# Patient Record
Sex: FEMALE | Race: WHITE | HISPANIC OR LATINO | Employment: UNEMPLOYED | URBAN - METROPOLITAN AREA
[De-identification: names, ages, dates, MRNs, and addresses within clinical notes are randomized per-mention and may not be internally consistent; named-entity substitution may affect disease eponyms.]

---

## 2018-06-18 LAB
EXTERNAL HIV SCREEN: NORMAL
HCV AB SER-ACNC: NON REACTIVE

## 2019-03-18 LAB
EXTERNAL HIV SCREEN: NORMAL
HCV AB SER-ACNC: NON REACTIVE

## 2020-09-08 LAB
EXTERNAL HIV SCREEN: NORMAL
HBA1C MFR BLD HPLC: 6.1 %

## 2020-12-14 LAB — HBA1C MFR BLD HPLC: 6.2 %

## 2021-05-28 ENCOUNTER — OFFICE VISIT (OUTPATIENT)
Dept: FAMILY MEDICINE CLINIC | Facility: CLINIC | Age: 61
End: 2021-05-28

## 2021-05-28 VITALS
DIASTOLIC BLOOD PRESSURE: 80 MMHG | TEMPERATURE: 97.3 F | WEIGHT: 175 LBS | BODY MASS INDEX: 37.76 KG/M2 | HEIGHT: 57 IN | OXYGEN SATURATION: 98 % | SYSTOLIC BLOOD PRESSURE: 122 MMHG | RESPIRATION RATE: 18 BRPM | HEART RATE: 67 BPM

## 2021-05-28 DIAGNOSIS — I10 ESSENTIAL HYPERTENSION: Primary | ICD-10-CM

## 2021-05-28 DIAGNOSIS — E78.5 HYPERLIPIDEMIA, UNSPECIFIED HYPERLIPIDEMIA TYPE: ICD-10-CM

## 2021-05-28 DIAGNOSIS — R73.03 PREDIABETES: ICD-10-CM

## 2021-05-28 DIAGNOSIS — J45.909 UNCOMPLICATED ASTHMA, UNSPECIFIED ASTHMA SEVERITY, UNSPECIFIED WHETHER PERSISTENT: ICD-10-CM

## 2021-05-28 PROCEDURE — 3008F BODY MASS INDEX DOCD: CPT | Performed by: FAMILY MEDICINE

## 2021-05-28 PROCEDURE — 1036F TOBACCO NON-USER: CPT | Performed by: FAMILY MEDICINE

## 2021-05-28 PROCEDURE — 99213 OFFICE O/P EST LOW 20 MIN: CPT | Performed by: FAMILY MEDICINE

## 2021-05-28 PROCEDURE — 3725F SCREEN DEPRESSION PERFORMED: CPT | Performed by: FAMILY MEDICINE

## 2021-05-28 RX ORDER — ATORVASTATIN CALCIUM 10 MG/1
10 TABLET, FILM COATED ORAL DAILY
COMMUNITY
End: 2021-05-28 | Stop reason: SDUPTHER

## 2021-05-28 RX ORDER — MONTELUKAST SODIUM 10 MG/1
10 TABLET ORAL
COMMUNITY
End: 2021-05-28 | Stop reason: SDUPTHER

## 2021-05-28 RX ORDER — ALBUTEROL SULFATE 90 UG/1
2 AEROSOL, METERED RESPIRATORY (INHALATION) EVERY 6 HOURS PRN
COMMUNITY
End: 2021-05-28 | Stop reason: SDUPTHER

## 2021-05-28 RX ORDER — ATORVASTATIN CALCIUM 10 MG/1
10 TABLET, FILM COATED ORAL DAILY
Qty: 90 TABLET | Refills: 1 | Status: SHIPPED | OUTPATIENT
Start: 2021-05-28 | End: 2021-09-10 | Stop reason: SDUPTHER

## 2021-05-28 RX ORDER — MONTELUKAST SODIUM 10 MG/1
10 TABLET ORAL
Qty: 90 TABLET | Refills: 1 | Status: SHIPPED | OUTPATIENT
Start: 2021-05-28 | End: 2021-09-10 | Stop reason: SDUPTHER

## 2021-05-28 RX ORDER — ALBUTEROL SULFATE 90 UG/1
1 AEROSOL, METERED RESPIRATORY (INHALATION) EVERY 6 HOURS PRN
Qty: 18 G | Refills: 2 | Status: SHIPPED | OUTPATIENT
Start: 2021-05-28 | End: 2021-09-10 | Stop reason: SDUPTHER

## 2021-05-28 NOTE — ASSESSMENT & PLAN NOTE
Patient reports due to allergies  Currently taking Montelukast with good control  Underwent ED evaluation once for asthma exacerbation and discharged with Albuterol inhaler which she has not used  Will refill Montelukast and Albuterol  Follow up as needed

## 2021-05-28 NOTE — ASSESSMENT & PLAN NOTE
Unclear what medications she is taking and patient is unsure - requested records from previous physician in [place]  BP today 122/80, at goal  Will follow up with appropriate labs and medication refills once information is available  Requested records and recommend patient contact pharmacy or call office with medications

## 2021-05-28 NOTE — PROGRESS NOTES
Assessment/Plan:    Hypertension  Unclear what medications she is taking and patient is unsure - requested records from previous physician in [place]  BP today 122/80, at goal  Will follow up with appropriate labs and medication refills once information is available  Requested records and recommend patient contact pharmacy or call office with medications  Hyperlipidemia  Patient reported - currently taking Atorvastatin 10 mg daily, confirmed by previous pharmacy  Refilled today  Will consider labs once records received if she is due  Asthma  Patient reports due to allergies  Currently taking Montelukast with good control  Underwent ED evaluation once for asthma exacerbation and discharged with Albuterol inhaler which she has not used  Will refill Montelukast and Albuterol  Follow up as needed  Prediabetes  Last labs unknown, will obtain via records request  Refilled Metformin 500 mg daily which she was previously prescribed  Will adjust regimen as appropriate when records available and labs ordered  Follow up in three months, will call once records received and if labs required  Diagnoses and all orders for this visit:    Essential hypertension    Hyperlipidemia, unspecified hyperlipidemia type  -     atorvastatin (LIPITOR) 10 mg tablet; Take 1 tablet (10 mg total) by mouth daily    Uncomplicated asthma, unspecified asthma severity, unspecified whether persistent  -     montelukast (SINGULAIR) 10 mg tablet; Take 1 tablet (10 mg total) by mouth daily at bedtime  -     albuterol (PROVENTIL HFA,VENTOLIN HFA) 90 mcg/act inhaler; Inhale 1 puff every 6 (six) hours as needed for wheezing or shortness of breath    Prediabetes  -     metFORMIN (GLUCOPHAGE) 500 mg tablet; Take 1 tablet (500 mg total) by mouth daily with breakfast          Subjective:      Patient ID: Maribell Blanco is a 57 y o  female  Moved from [place] to [place] one month ago, presents to establish care   Has history of HTN, prediabetes, hyperlipidemia, asthma  History of cholecystectomy  Was taking Atorvastatin 10 mg and CoQ10 for HLD  Takes Montelukast for asthma, well controlled  Metformin 500 mg for prediabetes  Macular and retinal degeneration, has appointment with ophthalmologist     Surgery - rufina, , appendectomy, ex lap for abdominal abscess  Last labs drawn unknown, 8-12+ months ago  Last mammogram 2020  Last pap one year ago, normal results  Unknown if HPV tested at that time  Last colonoscopy , 3 polyps removed, recommended f/u in 5 years  The following portions of the patient's history were reviewed and updated as appropriate:   She  has a past medical history of Anemia and Hypertension  She   Patient Active Problem List    Diagnosis Date Noted    Hypertension     Hyperlipidemia     Asthma     Prediabetes      She  has no past surgical history on file  Her family history is not on file  She  reports that she has never smoked  She has never used smokeless tobacco  She reports current alcohol use  She reports that she does not use drugs  Current Outpatient Medications   Medication Sig Dispense Refill    albuterol (PROVENTIL HFA,VENTOLIN HFA) 90 mcg/act inhaler Inhale 1 puff every 6 (six) hours as needed for wheezing or shortness of breath 18 g 2    atorvastatin (LIPITOR) 10 mg tablet Take 1 tablet (10 mg total) by mouth daily 90 tablet 1    metFORMIN (GLUCOPHAGE) 500 mg tablet Take 1 tablet (500 mg total) by mouth daily with breakfast 90 tablet 1    montelukast (SINGULAIR) 10 mg tablet Take 1 tablet (10 mg total) by mouth daily at bedtime 90 tablet 1     No current facility-administered medications for this visit  She is allergic to sulfa antibiotics       Review of Systems   Constitutional: Negative for appetite change, chills, fatigue and fever  HENT: Negative for congestion, rhinorrhea, sneezing and sore throat  Respiratory: Negative for cough and shortness of breath  Cardiovascular: Negative for chest pain, palpitations and leg swelling  Gastrointestinal: Negative for abdominal pain, blood in stool, constipation, diarrhea, nausea and vomiting  Objective:      /80 (BP Location: Left arm, Patient Position: Sitting, Cuff Size: Standard)   Pulse 67   Temp (!) 97 3 °F (36 3 °C) (Tympanic)   Resp 18   Ht 4' 9" (1 448 m)   Wt 79 4 kg (175 lb)   SpO2 98%   BMI 37 87 kg/m²          Physical Exam  Constitutional:       Appearance: Normal appearance  HENT:      Head: Normocephalic and atraumatic  Eyes:      Extraocular Movements: Extraocular movements intact  Conjunctiva/sclera: Conjunctivae normal    Cardiovascular:      Rate and Rhythm: Normal rate and regular rhythm  Pulses: Normal pulses  Heart sounds: Normal heart sounds  Pulmonary:      Effort: Pulmonary effort is normal       Breath sounds: Normal breath sounds  Abdominal:      General: Abdomen is flat  Bowel sounds are normal  There is no distension  Palpations: Abdomen is soft  Tenderness: There is no abdominal tenderness  Musculoskeletal: Normal range of motion  Skin:     General: Skin is warm and dry  Capillary Refill: Capillary refill takes less than 2 seconds  Neurological:      General: No focal deficit present  Mental Status: She is alert and oriented to person, place, and time     Psychiatric:         Mood and Affect: Mood normal          Behavior: Behavior normal

## 2021-05-28 NOTE — ASSESSMENT & PLAN NOTE
Last labs unknown, will obtain via records request  Refilled Metformin 500 mg daily which she was previously prescribed  Will adjust regimen as appropriate when records available and labs ordered

## 2021-05-28 NOTE — ASSESSMENT & PLAN NOTE
Patient reported - currently taking Atorvastatin 10 mg daily, confirmed by previous pharmacy  Refilled today  Will consider labs once records received if she is due

## 2021-08-24 ENCOUNTER — RA CDI HCC (OUTPATIENT)
Dept: OTHER | Facility: HOSPITAL | Age: 61
End: 2021-08-24

## 2021-08-24 NOTE — PROGRESS NOTES
[facility]  coding opportunities             Chart Reviewed * (Number of) Inbasket suggestions sent to Provider: 1     Problem listed updated  Provider Accepted, (number of) suggestions accepted: 1         Number of suggestions used: 0      Number of suggestions NOT actually used: 1     Patients insurance company: [employer] (Med[employer]vant[employer] and [employer])     Visit status: Patient arrived for their scheduled appointment        [facility] 75  coding opportunities             Chart Reviewed * (Number of) Inbasket suggestions sent to Provider: 1     Problem listed updated  Provider Accepted, (number of) suggestions accepted: 1               Patients insurance company: [employer] (Med[employer]vantage and [employer])           [place] [id number]  coding opportunities             Chart Reviewed * (Number of) Inbasket suggestions sent to Provider: 1                BMI>35   E66 01- Morbid (severe) obesity due to excess calories ([place] [id number] )    Z68  28- Z68 --  - Body mass index (BMI), adult  (Pick one from the Z68 35 - P42 --)           Per CMS/ICD 10 coding guidelines, to be used when BMI > 35 & <40 with one or more comorbidity (HTN, Hyperlipidemia)         Patients insurance company: [employer] (Medicare Advantage and [employer])

## 2021-08-25 PROBLEM — E66.01 SEVERE OBESITY (BMI 35.0-39.9) WITH COMORBIDITY (HCC): Status: ACTIVE | Noted: 2021-08-25

## 2021-08-31 ENCOUNTER — OFFICE VISIT (OUTPATIENT)
Dept: FAMILY MEDICINE CLINIC | Facility: CLINIC | Age: 61
End: 2021-08-31

## 2021-08-31 VITALS
SYSTOLIC BLOOD PRESSURE: 140 MMHG | DIASTOLIC BLOOD PRESSURE: 90 MMHG | HEIGHT: 57 IN | TEMPERATURE: 97.8 F | WEIGHT: 170 LBS | RESPIRATION RATE: 18 BRPM | BODY MASS INDEX: 36.68 KG/M2 | HEART RATE: 86 BPM

## 2021-08-31 DIAGNOSIS — R73.03 PREDIABETES: ICD-10-CM

## 2021-08-31 DIAGNOSIS — I10 ESSENTIAL HYPERTENSION: Primary | ICD-10-CM

## 2021-08-31 DIAGNOSIS — E78.5 HYPERLIPIDEMIA, UNSPECIFIED HYPERLIPIDEMIA TYPE: ICD-10-CM

## 2021-08-31 PROCEDURE — 99213 OFFICE O/P EST LOW 20 MIN: CPT | Performed by: FAMILY MEDICINE

## 2021-08-31 PROCEDURE — 3008F BODY MASS INDEX DOCD: CPT | Performed by: FAMILY MEDICINE

## 2021-08-31 PROCEDURE — 1036F TOBACCO NON-USER: CPT | Performed by: FAMILY MEDICINE

## 2021-08-31 RX ORDER — TRAVOPROST OPHTHALMIC SOLUTION 0.04 MG/ML
SOLUTION OPHTHALMIC
COMMUNITY
Start: 2021-08-21

## 2021-08-31 NOTE — ASSESSMENT & PLAN NOTE
Takes Candesartan 16/12 5 - one half tablet daily  /90 today  Continue current regimen, will check BMP and urine microalbumin/Cr ratio

## 2021-08-31 NOTE — PROGRESS NOTES
Assessment/Plan:    Hyperlipidemia  Last lipid panel August 2020  ASCVD risk at that time 4 5%, no need for statin at this time but she has been taking Atorvastatin 10 mg daily for the last year  Will check another Lipid panel and discontinue statin if normal     Prediabetes  Currently taking Metformin 500 mg daily  Will recheck A1c with labs - discontinue Metformin if normal     Hypertension  Takes Candesartan 16/12 5 - one half tablet daily  /90 today  Continue current regimen, will check BMP and urine microalbumin/Cr ratio  Diagnoses and all orders for this visit:    Essential hypertension  -     Basic metabolic panel; Future  -     Microalbumin / creatinine urine ratio    Prediabetes  -     HEMOGLOBIN A1C W/ EAG ESTIMATION; Future    Hyperlipidemia, unspecified hyperlipidemia type  -     Lipid Panel with Direct LDL reflex; Future    Other orders  -     travoprost (TRAVATAN-Z) 0 004 % ophthalmic solution; INSTILL 1 DROP INTO BOTH EYES IN THE EVENING  -     CALCIUM CITRATE PO; Take by mouth  -     candesartan 16 mg TABS 16 mg, hydrochlorothiazide 12 5 mg TABS 12 5 mg; Take 1 each by mouth daily Take 1/2 tab daily          Subjective:      Patient ID: Oli Mcmahon is a 57 y o  female  HPI   She present today for follow up of HTN, HLD, prediabetes  Has been compliant with medication regimen  Had bronchitis recently, resolved with steroids and antibiotics  No further acute concerns today  The following portions of the patient's history were reviewed and updated as appropriate: allergies, current medications, past family history, past medical history, past social history, past surgical history and problem list     Review of Systems   Constitutional: Negative for chills, fatigue and fever  Cardiovascular: Negative for chest pain  Gastrointestinal: Negative for abdominal pain  Musculoskeletal: Negative for back pain  Neurological: Negative for headaches     Psychiatric/Behavioral: Negative for sleep disturbance  Objective:      /90 (BP Location: Left arm, Patient Position: Sitting, Cuff Size: Large)   Pulse 86   Temp 97 8 °F (36 6 °C) (Temporal)   Resp 18   Ht 4' 9" (1 448 m)   Wt 77 1 kg (170 lb)   BMI 36 79 kg/m²          Physical Exam  Constitutional:       Appearance: Normal appearance  Eyes:      Extraocular Movements: Extraocular movements intact  Conjunctiva/sclera: Conjunctivae normal    Cardiovascular:      Rate and Rhythm: Normal rate and regular rhythm  Pulses: Normal pulses  Heart sounds: Normal heart sounds  Pulmonary:      Effort: Pulmonary effort is normal       Breath sounds: Normal breath sounds  Abdominal:      General: Abdomen is flat  Palpations: Abdomen is soft  Musculoskeletal:         General: Normal range of motion  Skin:     General: Skin is warm  Capillary Refill: Capillary refill takes less than 2 seconds  Neurological:      General: No focal deficit present  Mental Status: She is alert and oriented to person, place, and time     Psychiatric:         Mood and Affect: Mood normal          Behavior: Behavior normal

## 2021-08-31 NOTE — ASSESSMENT & PLAN NOTE
Currently taking Metformin 500 mg daily   Will recheck A1c with labs - discontinue Metformin if normal

## 2021-08-31 NOTE — ASSESSMENT & PLAN NOTE
Last lipid panel August 2020  ASCVD risk at that time 4 5%, no need for statin at this time but she has been taking Atorvastatin 10 mg daily for the last year   Will check another Lipid panel and discontinue statin if normal

## 2021-09-03 ENCOUNTER — APPOINTMENT (OUTPATIENT)
Dept: LAB | Age: 61
End: 2021-09-03
Payer: COMMERCIAL

## 2021-09-03 DIAGNOSIS — E78.5 HYPERLIPIDEMIA, UNSPECIFIED HYPERLIPIDEMIA TYPE: ICD-10-CM

## 2021-09-03 DIAGNOSIS — I10 ESSENTIAL HYPERTENSION: ICD-10-CM

## 2021-09-03 DIAGNOSIS — R73.03 PREDIABETES: ICD-10-CM

## 2021-09-03 LAB
ANION GAP SERPL CALCULATED.3IONS-SCNC: 6 MMOL/L (ref 4–13)
BUN SERPL-MCNC: 18 MG/DL (ref 5–25)
CALCIUM SERPL-MCNC: 9.5 MG/DL (ref 8.3–10.1)
CHLORIDE SERPL-SCNC: 105 MMOL/L (ref 100–108)
CHOLEST SERPL-MCNC: 148 MG/DL (ref 50–200)
CO2 SERPL-SCNC: 28 MMOL/L (ref 21–32)
CREAT SERPL-MCNC: 0.72 MG/DL (ref 0.6–1.3)
CREAT UR-MCNC: 45.9 MG/DL
EST. AVERAGE GLUCOSE BLD GHB EST-MCNC: 126 MG/DL
GFR SERPL CREATININE-BSD FRML MDRD: 90 ML/MIN/1.73SQ M
GLUCOSE P FAST SERPL-MCNC: 101 MG/DL (ref 65–99)
HBA1C MFR BLD: 6 %
HDLC SERPL-MCNC: 69 MG/DL
LDLC SERPL CALC-MCNC: 56 MG/DL (ref 0–100)
MICROALBUMIN UR-MCNC: <5 MG/L (ref 0–20)
MICROALBUMIN/CREAT 24H UR: <11 MG/G CREATININE (ref 0–30)
POTASSIUM SERPL-SCNC: 4 MMOL/L (ref 3.5–5.3)
SODIUM SERPL-SCNC: 139 MMOL/L (ref 136–145)
TRIGL SERPL-MCNC: 113 MG/DL

## 2021-09-03 PROCEDURE — 80048 BASIC METABOLIC PNL TOTAL CA: CPT

## 2021-09-03 PROCEDURE — 83036 HEMOGLOBIN GLYCOSYLATED A1C: CPT

## 2021-09-03 PROCEDURE — 82043 UR ALBUMIN QUANTITATIVE: CPT | Performed by: FAMILY MEDICINE

## 2021-09-03 PROCEDURE — 36415 COLL VENOUS BLD VENIPUNCTURE: CPT

## 2021-09-03 PROCEDURE — 80061 LIPID PANEL: CPT

## 2021-09-03 PROCEDURE — 82570 ASSAY OF URINE CREATININE: CPT | Performed by: FAMILY MEDICINE

## 2021-09-10 ENCOUNTER — TELEPHONE (OUTPATIENT)
Dept: FAMILY MEDICINE CLINIC | Facility: CLINIC | Age: 61
End: 2021-09-10

## 2021-09-10 DIAGNOSIS — E78.5 HYPERLIPIDEMIA, UNSPECIFIED HYPERLIPIDEMIA TYPE: ICD-10-CM

## 2021-09-10 DIAGNOSIS — R73.03 PREDIABETES: ICD-10-CM

## 2021-09-10 DIAGNOSIS — J45.909 UNCOMPLICATED ASTHMA, UNSPECIFIED ASTHMA SEVERITY, UNSPECIFIED WHETHER PERSISTENT: ICD-10-CM

## 2021-09-10 RX ORDER — ALBUTEROL SULFATE 90 UG/1
1 AEROSOL, METERED RESPIRATORY (INHALATION) EVERY 6 HOURS PRN
Qty: 18 G | Refills: 2 | Status: SHIPPED | OUTPATIENT
Start: 2021-09-10 | End: 2022-04-05 | Stop reason: SDUPTHER

## 2021-09-10 RX ORDER — MONTELUKAST SODIUM 10 MG/1
10 TABLET ORAL
Qty: 90 TABLET | Refills: 1 | Status: SHIPPED | OUTPATIENT
Start: 2021-09-10 | End: 2022-03-18

## 2021-09-10 RX ORDER — ATORVASTATIN CALCIUM 10 MG/1
10 TABLET, FILM COATED ORAL DAILY
Qty: 90 TABLET | Refills: 1 | Status: SHIPPED | OUTPATIENT
Start: 2021-09-10 | End: 2022-04-05

## 2021-09-10 NOTE — TELEPHONE ENCOUNTER
Patient called requesting refills on Metformin 500 mg, albuterol, atorvastatin 10 mg, montelukast 10 mg   Please send medication to [facility] pharmacy on [address] in [facility]

## 2021-09-13 DIAGNOSIS — I10 ESSENTIAL HYPERTENSION: Primary | ICD-10-CM

## 2021-09-14 RX ORDER — CANDESARTAN 16 MG/1
16 TABLET ORAL DAILY
Qty: 90 TABLET | Refills: 1 | OUTPATIENT
Start: 2021-09-14

## 2021-09-16 DIAGNOSIS — I10 ESSENTIAL HYPERTENSION: ICD-10-CM

## 2021-09-16 DIAGNOSIS — E78.5 HYPERLIPIDEMIA, UNSPECIFIED HYPERLIPIDEMIA TYPE: Primary | ICD-10-CM

## 2021-09-16 DIAGNOSIS — E66.01 SEVERE OBESITY (BMI 35.0-39.9) WITH COMORBIDITY (HCC): ICD-10-CM

## 2021-09-16 RX ORDER — CANDESARTAN CILEXETIL AND HYDROCHLOROTHIAZIDE 16; 12.5 MG/1; MG/1
0.5 TABLET ORAL DAILY
Qty: 45 TABLET | Refills: 1 | Status: SHIPPED | OUTPATIENT
Start: 2021-09-16 | End: 2022-03-16

## 2021-09-16 NOTE — TELEPHONE ENCOUNTER
Jessica Burrell calling again this med has not been sent, can it be sent by end of day today?  They are worried about pt missing so many days without the Candesartan/hctz

## 2021-09-17 ENCOUNTER — TELEPHONE (OUTPATIENT)
Dept: FAMILY MEDICINE CLINIC | Facility: CLINIC | Age: 61
End: 2021-09-17

## 2021-11-19 ENCOUNTER — APPOINTMENT (OUTPATIENT)
Dept: LAB | Age: 61
End: 2021-11-19
Payer: COMMERCIAL

## 2021-11-19 DIAGNOSIS — Z01.818 OTHER SPECIFIED PRE-OPERATIVE EXAMINATION: ICD-10-CM

## 2021-11-19 LAB
HCT VFR BLD AUTO: 41.7 % (ref 34.8–46.1)
HGB BLD-MCNC: 13.1 G/DL (ref 11.5–15.4)

## 2021-11-19 PROCEDURE — 36415 COLL VENOUS BLD VENIPUNCTURE: CPT

## 2021-11-19 PROCEDURE — 85014 HEMATOCRIT: CPT

## 2021-11-19 PROCEDURE — 85018 HEMOGLOBIN: CPT

## 2021-11-22 ENCOUNTER — OFFICE VISIT (OUTPATIENT)
Dept: FAMILY MEDICINE CLINIC | Facility: CLINIC | Age: 61
End: 2021-11-22

## 2021-11-22 VITALS
BODY MASS INDEX: 35.3 KG/M2 | OXYGEN SATURATION: 99 % | HEART RATE: 61 BPM | WEIGHT: 163.6 LBS | HEIGHT: 57 IN | SYSTOLIC BLOOD PRESSURE: 134 MMHG | TEMPERATURE: 97.9 F | DIASTOLIC BLOOD PRESSURE: 80 MMHG

## 2021-11-22 DIAGNOSIS — R73.03 PREDIABETES: ICD-10-CM

## 2021-11-22 DIAGNOSIS — Z01.818 PRE-OP EVALUATION: Primary | ICD-10-CM

## 2021-11-22 DIAGNOSIS — E66.01 SEVERE OBESITY (BMI 35.0-39.9) WITH COMORBIDITY (HCC): ICD-10-CM

## 2021-11-22 PROBLEM — H26.9 CATARACT: Status: ACTIVE | Noted: 2021-11-22

## 2021-11-22 LAB — SL AMB POCT HEMOGLOBIN AIC: 5.8 (ref ?–6.5)

## 2021-11-22 PROCEDURE — 3008F BODY MASS INDEX DOCD: CPT | Performed by: FAMILY MEDICINE

## 2021-11-22 PROCEDURE — 99242 OFF/OP CONSLTJ NEW/EST SF 20: CPT | Performed by: FAMILY MEDICINE

## 2021-11-22 PROCEDURE — 83036 HEMOGLOBIN GLYCOSYLATED A1C: CPT | Performed by: FAMILY MEDICINE

## 2021-11-22 PROCEDURE — 93000 ELECTROCARDIOGRAM COMPLETE: CPT | Performed by: FAMILY MEDICINE

## 2021-11-22 PROCEDURE — 1036F TOBACCO NON-USER: CPT | Performed by: FAMILY MEDICINE

## 2022-01-04 ENCOUNTER — OFFICE VISIT (OUTPATIENT)
Dept: FAMILY MEDICINE CLINIC | Facility: CLINIC | Age: 62
End: 2022-01-04

## 2022-01-04 VITALS
HEIGHT: 55 IN | OXYGEN SATURATION: 97 % | TEMPERATURE: 97.7 F | HEART RATE: 76 BPM | DIASTOLIC BLOOD PRESSURE: 80 MMHG | BODY MASS INDEX: 39.76 KG/M2 | SYSTOLIC BLOOD PRESSURE: 130 MMHG | WEIGHT: 171.8 LBS

## 2022-01-04 DIAGNOSIS — E66.01 CLASS 3 SEVERE OBESITY DUE TO EXCESS CALORIES WITH BODY MASS INDEX (BMI) OF 40.0 TO 44.9 IN ADULT, UNSPECIFIED WHETHER SERIOUS COMORBIDITY PRESENT (HCC): ICD-10-CM

## 2022-01-04 DIAGNOSIS — I10 PRIMARY HYPERTENSION: ICD-10-CM

## 2022-01-04 DIAGNOSIS — H26.40 SECONDARY CATARACT, UNSPECIFIED LATERALITY, UNSPECIFIED SECONDARY CATARACT TYPE: Primary | ICD-10-CM

## 2022-01-04 PROBLEM — E66.813 CLASS 3 SEVERE OBESITY DUE TO EXCESS CALORIES IN ADULT (HCC): Status: ACTIVE | Noted: 2021-08-25

## 2022-01-04 PROCEDURE — 1036F TOBACCO NON-USER: CPT | Performed by: FAMILY MEDICINE

## 2022-01-04 PROCEDURE — 3008F BODY MASS INDEX DOCD: CPT | Performed by: FAMILY MEDICINE

## 2022-01-04 PROCEDURE — 99213 OFFICE O/P EST LOW 20 MIN: CPT | Performed by: FAMILY MEDICINE

## 2022-01-04 RX ORDER — BROMFENAC SODIUM 0.7 MG/ML
SOLUTION/ DROPS OPHTHALMIC
COMMUNITY
Start: 2021-11-09

## 2022-01-04 RX ORDER — GATIFLOXACIN 5 MG/ML
SOLUTION/ DROPS OPHTHALMIC
COMMUNITY
Start: 2021-11-09

## 2022-01-04 RX ORDER — TRIPROLIDINE/PSEUDOEPHEDRINE 2.5MG-60MG
TABLET ORAL
COMMUNITY
Start: 2021-12-21

## 2022-01-04 NOTE — PROGRESS NOTES
Assessment/Plan:    Cataract  Underwent corrective surgery 1 month ago, still has some blurry vision, has follow up with ophthalmology on 1/27/22  Class 3 severe obesity due to excess calories in adult (HCC)  BMI increased due to poor diet recently, dietary counseling provided  She is agreeable to weight management referral      Hypertension  BP well controlled, at goal  Last urine micro/cr normal  No changes to current regimen  Follow up in 3 months       Diagnoses and all orders for this visit:    Secondary cataract, unspecified laterality, unspecified secondary cataract type    Class 3 severe obesity due to excess calories with body mass index (BMI) of 40 0 to 44 9 in adult, unspecified whether serious comorbidity present [facility])  -     Ambulatory referral to Weight Management; Future    Primary hypertension            Subjective:      Patient ID: Kendal Caba is a 57 y o  female  HPI  Patient presents for follow up HTN, obesity  BP well controlled on current regimen  Weight is increased, patient states poor diet during holidays  She plans to eat better and exercise and is agreeable to weight management referral  She has no further acute concerns at this time  The following portions of the patient's history were reviewed and updated as appropriate: allergies, current medications, past family history, past medical history, past social history, past surgical history and problem list     Review of Systems   Constitutional: Negative for fatigue  HENT: Negative for congestion and rhinorrhea  Eyes: Positive for visual disturbance  Negative for photophobia, pain, discharge and redness  Endocrine: Negative for polyuria  Genitourinary: Negative for dysuria  Psychiatric/Behavioral: Negative for sleep disturbance           Objective:      /80 (BP Location: Left arm, Patient Position: Sitting, Cuff Size: Standard)   Pulse 76   Temp 97 7 °F (36 5 °C) (Temporal)   Ht 4' 6 7" (1 389 m)   Wt 77 9 kg (171 lb 12 8 oz)   SpO2 97%   BMI 40 37 kg/m²          Physical Exam  Vitals reviewed  Eyes:      Extraocular Movements: Extraocular movements intact  Conjunctiva/sclera: Conjunctivae normal    Cardiovascular:      Rate and Rhythm: Normal rate and regular rhythm  Pulses: Normal pulses  Heart sounds: Normal heart sounds  Pulmonary:      Effort: Pulmonary effort is normal       Breath sounds: Normal breath sounds  Abdominal:      Palpations: Abdomen is soft  Musculoskeletal:         General: Normal range of motion  Cervical back: Normal range of motion  Skin:     General: Skin is warm and dry  Capillary Refill: Capillary refill takes less than 2 seconds  Neurological:      General: No focal deficit present  Mental Status: She is alert and oriented to person, place, and time     Psychiatric:         Mood and Affect: Mood normal          Behavior: Behavior normal

## 2022-01-04 NOTE — ASSESSMENT & PLAN NOTE
Underwent corrective surgery 1 month ago, still has some blurry vision, has follow up with ophthalmology on 1/27/22

## 2022-01-04 NOTE — ASSESSMENT & PLAN NOTE
BMI increased due to poor diet recently, dietary counseling provided   She is agreeable to weight management referral

## 2022-01-04 NOTE — ASSESSMENT & PLAN NOTE
BP well controlled, at goal  Last urine micro/cr normal  No changes to current regimen  Follow up in 3 months

## 2022-03-16 DIAGNOSIS — E66.01 SEVERE OBESITY (BMI 35.0-39.9) WITH COMORBIDITY (HCC): ICD-10-CM

## 2022-03-16 DIAGNOSIS — E78.5 HYPERLIPIDEMIA, UNSPECIFIED HYPERLIPIDEMIA TYPE: ICD-10-CM

## 2022-03-16 DIAGNOSIS — I10 ESSENTIAL HYPERTENSION: ICD-10-CM

## 2022-03-16 RX ORDER — CANDESARTAN CILEXETIL AND HYDROCHLOROTHIAZIDE 16; 12.5 MG/1; MG/1
TABLET ORAL
Qty: 45 TABLET | Refills: 1 | Status: SHIPPED | OUTPATIENT
Start: 2022-03-16

## 2022-03-18 DIAGNOSIS — J45.909 UNCOMPLICATED ASTHMA, UNSPECIFIED ASTHMA SEVERITY, UNSPECIFIED WHETHER PERSISTENT: ICD-10-CM

## 2022-03-18 RX ORDER — MONTELUKAST SODIUM 10 MG/1
TABLET ORAL
Qty: 90 TABLET | Refills: 1 | Status: SHIPPED | OUTPATIENT
Start: 2022-03-18

## 2022-03-28 ENCOUNTER — RA CDI HCC (OUTPATIENT)
Dept: OTHER | Facility: HOSPITAL | Age: 62
End: 2022-03-28

## 2022-03-28 NOTE — PROGRESS NOTES
[facility]  coding opportunities       Chart reviewed, no opportunity found: CHART REVIEWED, NO OPPORTUNITY FOUND        Patients Insurance        Commercial Insurance: [address] [place] [address]

## 2022-04-05 ENCOUNTER — OFFICE VISIT (OUTPATIENT)
Dept: FAMILY MEDICINE CLINIC | Facility: CLINIC | Age: 62
End: 2022-04-05

## 2022-04-05 VITALS
WEIGHT: 173.8 LBS | BODY MASS INDEX: 40.22 KG/M2 | HEIGHT: 55 IN | RESPIRATION RATE: 18 BRPM | OXYGEN SATURATION: 98 % | DIASTOLIC BLOOD PRESSURE: 80 MMHG | HEART RATE: 86 BPM | SYSTOLIC BLOOD PRESSURE: 126 MMHG | TEMPERATURE: 97.6 F

## 2022-04-05 DIAGNOSIS — Z13.820 OSTEOPOROSIS SCREENING: ICD-10-CM

## 2022-04-05 DIAGNOSIS — J45.909 UNCOMPLICATED ASTHMA, UNSPECIFIED ASTHMA SEVERITY, UNSPECIFIED WHETHER PERSISTENT: ICD-10-CM

## 2022-04-05 DIAGNOSIS — Z12.31 ENCOUNTER FOR SCREENING MAMMOGRAM FOR BREAST CANCER: ICD-10-CM

## 2022-04-05 DIAGNOSIS — Z00.00 ANNUAL PHYSICAL EXAM: Primary | ICD-10-CM

## 2022-04-05 PROCEDURE — 1036F TOBACCO NON-USER: CPT | Performed by: FAMILY MEDICINE

## 2022-04-05 PROCEDURE — 3008F BODY MASS INDEX DOCD: CPT | Performed by: FAMILY MEDICINE

## 2022-04-05 PROCEDURE — 99396 PREV VISIT EST AGE 40-64: CPT | Performed by: FAMILY MEDICINE

## 2022-04-05 PROCEDURE — 3725F SCREEN DEPRESSION PERFORMED: CPT | Performed by: FAMILY MEDICINE

## 2022-04-05 RX ORDER — ALBUTEROL SULFATE 90 UG/1
1 AEROSOL, METERED RESPIRATORY (INHALATION) EVERY 6 HOURS PRN
Qty: 18 G | Refills: 2 | Status: SHIPPED | OUTPATIENT
Start: 2022-04-05

## 2022-04-05 NOTE — PROGRESS NOTES
[facility]    NAME: William Varela  AGE: 60 y o  SEX: female  : 1960     DATE: 2022     Assessment and Plan:     Problem List Items Addressed This Visit        Respiratory    Asthma    Relevant Medications    albuterol (PROVENTIL HFA,VENTOLIN HFA) 90 mcg/act inhaler       Other    Annual physical exam - Primary     Up to date on labs  Mammogram ordered  DXA ordered  Up to date on cervical and colon cancer screening  Follow up in 6 months           Other Visit Diagnoses     Osteoporosis screening        Relevant Orders    DXA bone density spine hip and pelvis    Encounter for screening mammogram for breast cancer        Relevant Orders    Mammo screening bilateral w 3d & cad          Immunizations and preventive care screenings were discussed with patient today  Appropriate education was printed on patient's after visit summary  Counseling:  Alcohol/drug use: discussed moderation in alcohol intake, the recommendations for healthy alcohol use, and avoidance of illicit drug use  Dental Health: discussed importance of regular tooth brushing, flossing, and dental visits  · Exercise: the importance of regular exercise/physical activity was discussed  Recommend exercise 3-5 times per week for at least 30 minutes  Return in 6 months (on 10/5/2022)  Chief Complaint:     No chief complaint on file  History of Present Illness:     Adult Annual Physical   Patient here for a comprehensive physical exam  The patient reports no problems  Diet and Physical Activity  · Diet/Nutrition: well balanced diet  · Exercise: no formal exercise        Depression Screening  PHQ-2/9 Depression Screening    Little interest or pleasure in doing things: 0 - not at all  Feeling down, depressed, or hopeless: 0 - not at all  PHQ-2 Score: 0  PHQ-2 Interpretation: Negative depression screen       General Health  · Sleep: sleeps well and gets 7-8 hours of sleep on average  · Hearing: normal - bilateral   · Vision: goes for regular eye exams and wears glasses  · Dental: no dental visits for >1 year and brushes teeth twice daily  · Last pap 12/2020, cotest with HPV both negative     Review of Systems:     Review of Systems   Constitutional: Negative for chills, fatigue and fever  HENT: Negative for congestion  Respiratory: Negative for cough  Cardiovascular: Negative for chest pain  Gastrointestinal: Negative for abdominal pain  Musculoskeletal: Negative for back pain  Neurological: Negative for headaches  Psychiatric/Behavioral: Negative for sleep disturbance  Past Medical History:     Past Medical History:   Diagnosis Date    Anemia     Hypertension       Past Surgical History:     History reviewed  No pertinent surgical history  Social History:     Social History     Socioeconomic History    Marital status: Single     Spouse name: None    Number of children: None    Years of education: None    Highest education level: None   Occupational History    None   Tobacco Use    Smoking status: Never Smoker    Smokeless tobacco: Never Used   Vaping Use    Vaping Use: Never used   Substance and Sexual Activity    Alcohol use: Never    Drug use: Never    Sexual activity: Not Currently     Partners: Male   Other Topics Concern    None   Social History Narrative    None     Social Determinants of Health     Financial Resource Strain: Low Risk     Difficulty of Paying Living Expenses: Not hard at all   Food Insecurity: No Food Insecurity    Worried About Running Out of Food in the Last Year: Never true    Nicki of Food in the Last Year: Never true   Transportation Needs: No Transportation Needs    Lack of Transportation (Medical): No    Lack of Transportation (Non-Medical):  No   Physical Activity: Not on file   Stress: Not on file   Social Connections: Not on file   Intimate Partner Violence: Not on file   Housing Stability: Not on file      Family History:     Family History   Problem Relation Age of Onset    No Known Problems Mother     No Known Problems Father       Current Medications:     Current Outpatient Medications   Medication Sig Dispense Refill    albuterol (PROVENTIL HFA,VENTOLIN HFA) 90 mcg/act inhaler Inhale 1 puff every 6 (six) hours as needed for wheezing or shortness of breath 18 g 2    CALCIUM CITRATE PO Take by mouth      candesartan-hydrochlorothiazide (ATACAND HCT) 16-12 5 MG per tablet TAKE 1/2 TABLET BY MOUTH EVERY DAY 45 tablet 1    Durezol 0 05 % EMUL       gatifloxacin (ZYMAXID) 0 5 %       montelukast (SINGULAIR) 10 mg tablet TAKE 1 TABLET BY MOUTH DAILY AT BEDTIME 90 tablet 1    Prolensa 0 07 % SOLN       travoprost (TRAVATAN-Z) 0 004 % ophthalmic solution INSTILL 1 DROP INTO BOTH EYES IN THE EVENING       No current facility-administered medications for this visit  Allergies: Allergies   Allergen Reactions    Sulfa Antibiotics Blisters      Physical Exam:     /80 (BP Location: Left arm, Patient Position: Sitting, Cuff Size: Large)   Pulse 86   Temp 97 6 °F (36 4 °C) (Temporal)   Resp 18   Ht 4' 6 7" (1 389 m)   Wt 78 8 kg (173 lb 12 8 oz)   SpO2 98%   BMI 40 84 kg/m²     Physical Exam  Vitals reviewed  Constitutional:       Appearance: Normal appearance  HENT:      Head: Normocephalic and atraumatic  Nose: Nose normal    Eyes:      Extraocular Movements: Extraocular movements intact  Conjunctiva/sclera: Conjunctivae normal    Cardiovascular:      Rate and Rhythm: Normal rate and regular rhythm  Pulses: Normal pulses  Heart sounds: Normal heart sounds  Pulmonary:      Effort: Pulmonary effort is normal       Breath sounds: Normal breath sounds  Abdominal:      General: Abdomen is flat  Bowel sounds are normal       Palpations: Abdomen is soft     Musculoskeletal:         General: Normal range of motion  Skin:     General: Skin is warm and dry  Capillary Refill: Capillary refill takes less than 2 seconds  Neurological:      General: No focal deficit present  Mental Status: She is alert and oriented to person, place, and time     Psychiatric:         Mood and Affect: Mood normal          Behavior: Behavior normal           Laura Solitario DO  [address]

## 2022-04-05 NOTE — PATIENT INSTRUCTIONS

## 2022-04-05 NOTE — ASSESSMENT & PLAN NOTE
Up to date on labs  Mammogram ordered  DXA ordered  Up to date on cervical and colon cancer screening  Follow up in 6 months

## 2022-08-05 ENCOUNTER — HOSPITAL ENCOUNTER (OUTPATIENT)
Dept: RADIOLOGY | Facility: IMAGING CENTER | Age: 62
Discharge: HOME | End: 2022-08-05
Payer: COMMERCIAL

## 2022-08-05 VITALS — WEIGHT: 176 LBS | BODY MASS INDEX: 40.73 KG/M2 | HEIGHT: 55 IN

## 2022-08-05 DIAGNOSIS — Z12.31 ENCOUNTER FOR SCREENING MAMMOGRAM FOR BREAST CANCER: ICD-10-CM

## 2022-08-05 DIAGNOSIS — Z13.820 OSTEOPOROSIS SCREENING: ICD-10-CM

## 2022-08-05 PROCEDURE — 77063 BREAST TOMOSYNTHESIS BI: CPT

## 2022-08-05 PROCEDURE — 77080 DXA BONE DENSITY AXIAL: CPT

## 2022-08-05 PROCEDURE — 77067 SCR MAMMO BI INCL CAD: CPT

## 2022-09-04 DIAGNOSIS — I10 ESSENTIAL HYPERTENSION: ICD-10-CM

## 2022-09-04 DIAGNOSIS — E66.01 SEVERE OBESITY (BMI 35.0-39.9) WITH COMORBIDITY (HCC): ICD-10-CM

## 2022-09-04 DIAGNOSIS — J45.909 UNCOMPLICATED ASTHMA, UNSPECIFIED ASTHMA SEVERITY, UNSPECIFIED WHETHER PERSISTENT: ICD-10-CM

## 2022-09-04 DIAGNOSIS — E78.5 HYPERLIPIDEMIA, UNSPECIFIED HYPERLIPIDEMIA TYPE: ICD-10-CM

## 2022-09-06 RX ORDER — CANDESARTAN CILEXETIL AND HYDROCHLOROTHIAZIDE 16; 12.5 MG/1; MG/1
TABLET ORAL
Qty: 45 TABLET | Refills: 1 | Status: SHIPPED | OUTPATIENT
Start: 2022-09-06

## 2022-09-06 RX ORDER — MONTELUKAST SODIUM 10 MG/1
TABLET ORAL
Qty: 90 TABLET | Refills: 1 | Status: SHIPPED | OUTPATIENT
Start: 2022-09-06

## 2022-10-07 ENCOUNTER — OFFICE VISIT (OUTPATIENT)
Dept: FAMILY MEDICINE CLINIC | Facility: CLINIC | Age: 62
End: 2022-10-07

## 2022-10-07 VITALS
DIASTOLIC BLOOD PRESSURE: 85 MMHG | HEART RATE: 68 BPM | SYSTOLIC BLOOD PRESSURE: 145 MMHG | BODY MASS INDEX: 40.45 KG/M2 | RESPIRATION RATE: 20 BRPM | HEIGHT: 55 IN | OXYGEN SATURATION: 98 % | TEMPERATURE: 98.1 F | WEIGHT: 174.8 LBS

## 2022-10-07 DIAGNOSIS — Z13.220 ENCOUNTER FOR LIPID SCREENING FOR CARDIOVASCULAR DISEASE: Primary | ICD-10-CM

## 2022-10-07 DIAGNOSIS — Z13.6 ENCOUNTER FOR LIPID SCREENING FOR CARDIOVASCULAR DISEASE: Primary | ICD-10-CM

## 2022-10-07 DIAGNOSIS — E66.01 CLASS 3 SEVERE OBESITY DUE TO EXCESS CALORIES WITH BODY MASS INDEX (BMI) OF 40.0 TO 44.9 IN ADULT, UNSPECIFIED WHETHER SERIOUS COMORBIDITY PRESENT (HCC): ICD-10-CM

## 2022-10-07 DIAGNOSIS — R73.03 PREDIABETES: ICD-10-CM

## 2022-10-07 DIAGNOSIS — Z13.1 SCREENING FOR DIABETES MELLITUS: ICD-10-CM

## 2022-10-07 DIAGNOSIS — G47.33 OSA (OBSTRUCTIVE SLEEP APNEA): ICD-10-CM

## 2022-10-07 DIAGNOSIS — I10 PRIMARY HYPERTENSION: ICD-10-CM

## 2022-10-07 PROCEDURE — 99213 OFFICE O/P EST LOW 20 MIN: CPT | Performed by: FAMILY MEDICINE

## 2022-10-10 PROBLEM — G47.33 OSA (OBSTRUCTIVE SLEEP APNEA): Status: ACTIVE | Noted: 2022-10-10

## 2022-10-10 NOTE — ASSESSMENT & PLAN NOTE
BMI Counseling: Body mass index is 42 15 kg/m²  The BMI is above normal  Nutrition recommendations include reducing portion sizes, decreasing overall calorie intake, 3-5 servings of fruits/vegetables daily, reducing fast food intake, consuming healthier snacks and decreasing soda and/or juice intake  Exercise recommendations include moderate aerobic physical activity for 150 minutes/week, joining a gym and strength training exercises  Patient referred to weight management due to patient being morbidly obese

## 2022-10-10 NOTE — ASSESSMENT & PLAN NOTE
Pt reports that she uses CPAP nightly for over 5 years  She recalls that CPAP machine should be changed/checked every 5 years and requests sleep referral  Order placed

## 2022-10-10 NOTE — PROGRESS NOTES
Name: Torey Gilliland      : 1960      MRN: [id number]  Encounter Provider: Shun Blank DO  Encounter Date: 10/7/2022   Encounter department: [address] [place] [address]     1  Encounter for lipid screening for cardiovascular disease  -     Lipid panel; Future  -     Basic metabolic panel; Future    2  Screening for diabetes mellitus  -     Basic metabolic panel; Future  -     HEMOGLOBIN A1C W/ EAG ESTIMATION; Future    3  Primary hypertension  -     Basic metabolic panel; Future    4  TOM (obstructive sleep apnea)  Assessment & Plan:  Pt reports that she uses CPAP nightly for over 5 years  She recalls that CPAP machine should be changed/checked every 5 years and requests sleep referral  Order placed  Orders:  -     Ambulatory Referral to Sleep Medicine; Future    5  Class 3 severe obesity due to excess calories with body mass index (BMI) of 40 0 to 44 9 in adult, unspecified whether serious comorbidity present [facility])  Assessment & Plan:  BMI Counseling: Body mass index is 42 15 kg/m²  The BMI is above normal  Nutrition recommendations include reducing portion sizes, decreasing overall calorie intake, 3-5 servings of fruits/vegetables daily, reducing fast food intake, consuming healthier snacks and decreasing soda and/or juice intake  Exercise recommendations include moderate aerobic physical activity for 150 minutes/week, joining a gym and strength training exercises  Patient referred to weight management due to patient being morbidly obese  Orders:  -     Ambulatory Referral to Weight Management; Future    6  Prediabetes  Assessment & Plan:  Ordered A1c with annual labs  Last in 2021 is 5 8% and has been off of metformin since  Subjective      HPI   Torey Gilliland is a 60 y o  female who presents to clinic for 6 mo follow up  She is doing well and has no complaints  Review of Systems   Constitutional: Negative for chills and fever  HENT: Negative for ear pain and sore throat  Eyes: Negative for visual disturbance  Respiratory: Negative for cough and shortness of breath  Cardiovascular: Negative for chest pain and palpitations  Gastrointestinal: Negative for abdominal pain, constipation, diarrhea, nausea and vomiting  Genitourinary: Negative for dysuria and hematuria  Neurological: Negative for seizures and syncope  All other systems reviewed and are negative  Current Outpatient Medications on File Prior to Visit   Medication Sig   • albuterol (PROVENTIL HFA,VENTOLIN HFA) 90 mcg/act inhaler Inhale 1 puff every 6 (six) hours as needed for wheezing or shortness of breath   • CALCIUM CITRATE PO Take by mouth   • candesartan-hydrochlorothiazide (ATACAND HCT) 16-12 5 MG per tablet TAKE 1/2 TABLET BY MOUTH EVERY DAY   • Durezol 0 05 % EMUL    • gatifloxacin (ZYMAXID) 0 5 %    • montelukast (SINGULAIR) 10 mg tablet TAKE 1 TABLET BY MOUTH EVERYDAY AT BEDTIME   • Prolensa 0 07 % SOLN    • travoprost (TRAVATAN-Z) 0 004 % ophthalmic solution INSTILL 1 DROP INTO BOTH EYES IN THE EVENING       Objective     /85 (BP Location: Right arm, Patient Position: Sitting, Cuff Size: Large)   Pulse 68   Temp 98 1 °F (36 7 °C) (Temporal)   Resp 20   Ht 4' 6" (1 372 m)   Wt 79 3 kg (174 lb 12 8 oz)   SpO2 98%   BMI 42 15 kg/m²     Physical Exam  Constitutional:       General: She is not in acute distress  Appearance: Normal appearance  HENT:      Head: Normocephalic and atraumatic  Right Ear: External ear normal       Left Ear: External ear normal    Eyes:      Extraocular Movements: Extraocular movements intact  Conjunctiva/sclera: Conjunctivae normal    Cardiovascular:      Rate and Rhythm: Normal rate and regular rhythm  Pulses: Normal pulses  Heart sounds: Normal heart sounds  Pulmonary:      Effort: Pulmonary effort is normal  No respiratory distress  Breath sounds: Normal breath sounds   No wheezing, rhonchi or rales  Abdominal:      General: Bowel sounds are normal       Palpations: Abdomen is soft  Tenderness: There is no abdominal tenderness  Musculoskeletal:      Cervical back: Normal range of motion and neck supple  No tenderness  Right lower leg: No edema  Left lower leg: No edema  Lymphadenopathy:      Cervical: No cervical adenopathy  Skin:     General: Skin is warm and dry  Neurological:      General: No focal deficit present  Mental Status: She is alert and oriented to person, place, and time        Gait: Gait normal    Psychiatric:         Mood and Affect: Mood normal          Behavior: Behavior normal        Coye Innocent, DO

## 2022-10-15 ENCOUNTER — APPOINTMENT (OUTPATIENT)
Dept: LAB | Age: 62
End: 2022-10-15
Payer: COMMERCIAL

## 2022-10-15 DIAGNOSIS — I10 PRIMARY HYPERTENSION: ICD-10-CM

## 2022-10-15 DIAGNOSIS — Z13.6 ENCOUNTER FOR LIPID SCREENING FOR CARDIOVASCULAR DISEASE: ICD-10-CM

## 2022-10-15 DIAGNOSIS — Z13.1 SCREENING FOR DIABETES MELLITUS: ICD-10-CM

## 2022-10-15 DIAGNOSIS — Z13.220 ENCOUNTER FOR LIPID SCREENING FOR CARDIOVASCULAR DISEASE: ICD-10-CM

## 2022-10-15 LAB
ANION GAP SERPL CALCULATED.3IONS-SCNC: 6 MMOL/L (ref 4–13)
BUN SERPL-MCNC: 13 MG/DL (ref 5–25)
CALCIUM SERPL-MCNC: 9.7 MG/DL (ref 8.3–10.1)
CHLORIDE SERPL-SCNC: 106 MMOL/L (ref 96–108)
CHOLEST SERPL-MCNC: 192 MG/DL
CO2 SERPL-SCNC: 26 MMOL/L (ref 21–32)
CREAT SERPL-MCNC: 0.78 MG/DL (ref 0.6–1.3)
EST. AVERAGE GLUCOSE BLD GHB EST-MCNC: 128 MG/DL
GFR SERPL CREATININE-BSD FRML MDRD: 81 ML/MIN/1.73SQ M
GLUCOSE P FAST SERPL-MCNC: 109 MG/DL (ref 65–99)
HBA1C MFR BLD: 6.1 %
HDLC SERPL-MCNC: 65 MG/DL
LDLC SERPL CALC-MCNC: 106 MG/DL (ref 0–100)
NONHDLC SERPL-MCNC: 127 MG/DL
POTASSIUM SERPL-SCNC: 3.9 MMOL/L (ref 3.5–5.3)
SODIUM SERPL-SCNC: 138 MMOL/L (ref 135–147)
TRIGL SERPL-MCNC: 103 MG/DL

## 2022-10-15 PROCEDURE — 80061 LIPID PANEL: CPT

## 2022-10-15 PROCEDURE — 36415 COLL VENOUS BLD VENIPUNCTURE: CPT

## 2022-10-15 PROCEDURE — 83036 HEMOGLOBIN GLYCOSYLATED A1C: CPT

## 2022-10-15 PROCEDURE — 80048 BASIC METABOLIC PNL TOTAL CA: CPT

## 2022-10-19 ENCOUNTER — OFFICE VISIT (OUTPATIENT)
Dept: SLEEP CENTER | Facility: CLINIC | Age: 62
End: 2022-10-19
Payer: COMMERCIAL

## 2022-10-19 VITALS
WEIGHT: 175.6 LBS | BODY MASS INDEX: 40.64 KG/M2 | SYSTOLIC BLOOD PRESSURE: 136 MMHG | HEART RATE: 74 BPM | HEIGHT: 55 IN | OXYGEN SATURATION: 94 % | DIASTOLIC BLOOD PRESSURE: 80 MMHG

## 2022-10-19 DIAGNOSIS — E66.01 MORBID OBESITY (HCC): ICD-10-CM

## 2022-10-19 DIAGNOSIS — J45.909 UNCOMPLICATED ASTHMA, UNSPECIFIED ASTHMA SEVERITY, UNSPECIFIED WHETHER PERSISTENT: ICD-10-CM

## 2022-10-19 DIAGNOSIS — G47.09 OTHER INSOMNIA: ICD-10-CM

## 2022-10-19 DIAGNOSIS — G47.33 OSA (OBSTRUCTIVE SLEEP APNEA): Primary | ICD-10-CM

## 2022-10-19 DIAGNOSIS — I10 PRIMARY HYPERTENSION: ICD-10-CM

## 2022-10-19 DIAGNOSIS — G47.62 NOCTURNAL LEG CRAMPS: ICD-10-CM

## 2022-10-19 DIAGNOSIS — G25.81 RLS (RESTLESS LEGS SYNDROME): ICD-10-CM

## 2022-10-19 PROCEDURE — 99244 OFF/OP CNSLTJ NEW/EST MOD 40: CPT | Performed by: INTERNAL MEDICINE

## 2022-10-19 NOTE — PATIENT INSTRUCTIONS

## 2022-10-19 NOTE — PROGRESS NOTES
Review of Systems      Genitourinary none   Cardiology none   Gastrointestinal abdominal pain or cramping that disturb sleep    Neurology numbness/tingling of an extremity   Constitutional excessive sweating at night   Integumentary itching   Psychiatry none   Musculoskeletal back pain and leg cramps   Pulmonary none   ENT none   Endocrine none   Hematological none

## 2022-10-19 NOTE — PROGRESS NOTES
Consultation - [address]  60 y o  female  GDH:[id number]  DENI:[id number]  DOS:10/19/2022    Physician Requesting Consult: Joselyn Walsh DO             Reason for Consult : At your kind request I saw Katie Pink for initial sleep evaluation today  She was diagnosed with obstructive sleep apnea over 5 years ago in [place] and prescribed CPAP  She is reasonably conversant in [place] but history was obtained with aid of an   She is here because she needs supplies  Results of prior studies were not available  PFSH, Problem List, Medications & Allergies were reviewed in EMR  Spenser Cooper  has a past medical history of Anemia and Hypertension  She has a current medication list which includes the following prescription(s): albuterol, calcium citrate, candesartan-hydrochlorothiazide, travoprost, durezol, gatifloxacin, montelukast, and prolensa  HPI:  She is using CPAP regularly and benefits from use  She is unable to sleep without it  Patient reports has not been using [employer] to sanitize the machine  She did not bring the machine or Sim card so no compliance data was available  Other Complaints: none  Restless Leg Syndrome: reports suggestive symptoms  And leg cramps that can disturb sleep  Parasomnia: no features reported    Sleep Routine (on average):   Typical Bedtime:  10-11 p m  Gets OOB:  5:00 a m  TIB:>6 hrs  Sleep latency:<  30 minutes Sleep Interruptions:2-3/nite   struggles to fall back asleep  Awakens: spontaneously  Upon awakening: usually feels refreshed   Spenser Cooper denies Excessive Daytime Sleepiness  or napping   She rated herself at Total score: 1 /24 on the [place] Sleepiness Scale  Habits:  reports that she has never smoked  She has never used smokeless tobacco  ;   E-Cigarette/Vaping   • E-Cigarette Use Never User     ;  reports no history of drug use ;  reports no history of alcohol use  ; Caffeine use:limited ; Exercise routine: none   Family History: Negative for sleep disturbance  ROS - reviewed and as attached  Significant for weight has been stable     She reported no nasal symptoms  Asthma is controlled  She reported no cardiac symptoms  She sweats excessively during sleep  EXAM:  /80   Pulse 74   Ht 4' 7" (1 397 m)   Wt 79 7 kg (175 lb 9 6 oz)   SpO2 94%   BMI 40 81 kg/m²    General: Well groomed female, well appearing, in no apparent distress  Neurological: Alert and orientated;  cooperative; Cranial nerves intact;    Psychiatric: Speech:clear and coherent;  Normal mood, affect & thought   Skin: warm and dry; Color& Hydration good; no facial rashes or lesions   HEENT:  Craniofacial anatomy: normal Sinuses: non- tender  TMJ: Normal    Eyes: EOM's intact;  conjunctiva/corneas clear   Ears: Externallyappear normal     Nasal Airway: is patent Septum:intact; Mucosa: normal; Turbinates: normal; Rhinorrhea: None   Mouth: Lips: normal posture; Dentition: normal   Mucosa:moist  ; Hard Palate:short    Oropharryx: crowded and AP narrowing Tongue: Mallampati:Class IV, Mobile and MacroglossiaSoft Palate:  redundant  Tonsils: absent  Neck:is thick and excess fatty tissue; Neck Circumference: 16 5 "; Supple; no abnormal masses; Thyroid:normal  Trachea:central     Lymph: No Cervical or Submandibular Lymhadenopathy  Heart: S1,S2 normal; RRR; no gallop; no murmurs   Lungs: Respiratory Effort:normal  Air entry good bilaterally  No wheezes  No rales  Abdomen: Obese, Soft & non-tender    Extremities: No pedal edema  No clubbing or cyanosis  Musculoskeletal:  Motor normal; Gait:normal        IMPRESSION: Primary/Secondary Sleep Diagnoses (to Medical or Psych conditions) & Comorbidities   1  TOM (obstructive sleep apnea)  Ambulatory Referral to Sleep Medicine    Split Study   2  Other insomnia     3  RLS (restless legs syndrome)     4  Nocturnal leg cramps     5   Uncomplicated asthma, unspecified asthma severity, unspecified whether persistent     6  Primary hypertension     7  Morbid obesity ([facility] [address] )          PLAN:   1  She states results of prior studies are no longer available       2  With respect to above conditions, I counseled on pathophysiology, diagnosis, treatment options, risks and benefits; inter-relationship and effects on symptoms and comorbidities; risks of no treatment; costs and insurance aspects  3  Patient elected to continue positive airway pressure therapy and it is medically necessary  Since results are no longer available, she will need re-evaluation and a split study  is to be scheduled  Home sleep testing is contraindicated because Severe insomnia, RLS and High risk for sleep-related hypoventilation  4  I also advised on weight reduction  5  Follow-up to be scheduled after the study to review results and to initiate therapy  Sincerely,      Authenticated electronically on 77/48/88   Board Certified Specialist     Portions of the record may have been created with voice recognition software  Occasional wrong word or "sound a like" substitutions may have occurred due to the inherent limitations of voice recognition software  There may also be notations and random deletions of words or characters from malfunctioning software  Read the chart carefully and recognize, using context, where substitutions/deletions have occurred

## 2022-10-28 ENCOUNTER — OFFICE VISIT (OUTPATIENT)
Dept: FAMILY MEDICINE CLINIC | Facility: CLINIC | Age: 62
End: 2022-10-28

## 2022-10-28 VITALS
SYSTOLIC BLOOD PRESSURE: 125 MMHG | WEIGHT: 177 LBS | OXYGEN SATURATION: 98 % | HEIGHT: 55 IN | TEMPERATURE: 97.5 F | DIASTOLIC BLOOD PRESSURE: 78 MMHG | RESPIRATION RATE: 16 BRPM | HEART RATE: 67 BPM | BODY MASS INDEX: 40.96 KG/M2

## 2022-10-28 DIAGNOSIS — R73.03 PREDIABETES: Primary | ICD-10-CM

## 2022-10-28 DIAGNOSIS — I10 PRIMARY HYPERTENSION: ICD-10-CM

## 2022-10-28 NOTE — ASSESSMENT & PLAN NOTE
Recent labs done 10/15 reviewed with patient today   A1c is 6 1 which is worse from previous 5 8 so discussed extensively with patient about healthy lifestyle habits including diet and exercise

## 2022-10-28 NOTE — PROGRESS NOTES
Name: Reagan Reed      : 1960      MRN: [id number]  Encounter Provider: Lashay Kessler MD  Encounter Date: 10/28/2022   Encounter department: [address] [place] [address]     1  Prediabetes  Assessment & Plan:  Recent labs done 10/15 reviewed with patient today  A1c is 6 1 which is worse from previous 5 8 so discussed extensively with patient about healthy lifestyle habits including diet and exercise      2  Primary hypertension  Assessment & Plan:  BP well controlled at 125/78, at goal  Continue current regimen of candesartan-HCTZ         Subjective      Reagan Reed is a 60 y o  female 3 prediabetes and HTN who presents to clinic for follow up to discuss recent lab results  Patient was seen on 10/07 for follow-up  She is doing well and has no complaints  Lipid panel which shows elevated LDL cholesterol discussed with patient today as well as healthy lifestyle habits to help improve numbers  Review of Systems   Constitutional: Negative for fatigue and fever  HENT: Negative for sore throat  Respiratory: Negative for cough, shortness of breath and wheezing  Cardiovascular: Negative for chest pain, palpitations and leg swelling  Gastrointestinal: Negative for abdominal pain, diarrhea, nausea and vomiting  Genitourinary: Negative for dysuria and pelvic pain  Skin: Negative for rash  Neurological: Negative for dizziness, weakness and headaches         Current Outpatient Medications on File Prior to Visit   Medication Sig   • albuterol (PROVENTIL HFA,VENTOLIN HFA) 90 mcg/act inhaler Inhale 1 puff every 6 (six) hours as needed for wheezing or shortness of breath   • CALCIUM CITRATE PO Take by mouth   • candesartan-hydrochlorothiazide (ATACAND HCT) 16-12 5 MG per tablet TAKE 1/2 TABLET BY MOUTH EVERY DAY   • montelukast (SINGULAIR) 10 mg tablet TAKE 1 TABLET BY MOUTH EVERYDAY AT BEDTIME   • travoprost (TRAVATAN-Z) 0 004 % ophthalmic solution INSTILL 1 DROP INTO BOTH EYES IN THE EVENING   • [DISCONTINUED] Durezol 0 05 % EMUL    • [DISCONTINUED] gatifloxacin (ZYMAXID) 0 5 %    • [DISCONTINUED] Prolensa 0 07 % SOLN        Objective     /78 (BP Location: Right arm, Patient Position: Sitting, Cuff Size: Large)   Pulse 67   Temp 97 5 °F (36 4 °C) (Temporal)   Resp 16   Ht 4' 7" (1 397 m)   Wt 80 3 kg (177 lb)   SpO2 98%   BMI 41 14 kg/m²     Physical Exam  Constitutional:       General: She is not in acute distress  Appearance: She is obese  She is not ill-appearing  HENT:      Head: Normocephalic and atraumatic  Right Ear: External ear normal       Left Ear: External ear normal       Nose: Nose normal    Eyes:      Conjunctiva/sclera: Conjunctivae normal    Cardiovascular:      Rate and Rhythm: Normal rate and regular rhythm  Heart sounds: Normal heart sounds  Pulmonary:      Effort: Pulmonary effort is normal  No respiratory distress  Breath sounds: Normal breath sounds  No wheezing  Musculoskeletal:         General: No swelling, tenderness or deformity  Normal range of motion  Right lower leg: No edema  Left lower leg: No edema  Skin:     General: Skin is warm  Findings: No rash  Neurological:      Mental Status: She is alert         Dinora Flores MD

## 2023-03-05 DIAGNOSIS — I10 ESSENTIAL HYPERTENSION: ICD-10-CM

## 2023-03-05 DIAGNOSIS — J45.909 UNCOMPLICATED ASTHMA, UNSPECIFIED ASTHMA SEVERITY, UNSPECIFIED WHETHER PERSISTENT: ICD-10-CM

## 2023-03-05 DIAGNOSIS — E66.01 SEVERE OBESITY (BMI 35.0-39.9) WITH COMORBIDITY (HCC): ICD-10-CM

## 2023-03-05 DIAGNOSIS — E78.5 HYPERLIPIDEMIA, UNSPECIFIED HYPERLIPIDEMIA TYPE: ICD-10-CM

## 2023-03-06 RX ORDER — CANDESARTAN CILEXETIL AND HYDROCHLOROTHIAZIDE 16; 12.5 MG/1; MG/1
TABLET ORAL
Qty: 45 TABLET | Refills: 1 | Status: SHIPPED | OUTPATIENT
Start: 2023-03-06

## 2023-03-06 RX ORDER — MONTELUKAST SODIUM 10 MG/1
TABLET ORAL
Qty: 90 TABLET | Refills: 1 | Status: SHIPPED | OUTPATIENT
Start: 2023-03-06

## 2023-04-14 DIAGNOSIS — G47.33 OSA (OBSTRUCTIVE SLEEP APNEA): Primary | ICD-10-CM

## 2023-04-28 ENCOUNTER — TELEPHONE (OUTPATIENT)
Dept: SLEEP CENTER | Facility: CLINIC | Age: 63
End: 2023-04-28

## 2023-04-28 NOTE — TELEPHONE ENCOUNTER
Sleep study resulted and shows severe TOM([address] 9)  APAP ordered  Per 10/19/22 consult note, patient is currently using CPAP and was diagnosed over 5 years ago  She wanted to establish care in order to get new CPAP supplies  Left message for patient to call office to review sleep study results  Needs to schedule DME set up and compliance follow up appointments

## 2023-05-06 ENCOUNTER — RA CDI HCC (OUTPATIENT)
Dept: OTHER | Facility: HOSPITAL | Age: 63
End: 2023-05-06

## 2023-05-06 NOTE — PROGRESS NOTES
NOT on the BPA- please assess using MEAT for 2023 billing    [facility] [address]  coding opportunities          Chart Reviewed number of suggestions sent to Provider: 1     Patients Insurance        Commercial Insurance: [employer]

## 2023-05-19 ENCOUNTER — TELEPHONE (OUTPATIENT)
Dept: SLEEP CENTER | Facility: CLINIC | Age: 63
End: 2023-05-19

## 2023-05-19 LAB

## 2023-05-19 NOTE — TELEPHONE ENCOUNTER
DME setup 6/2/2023 in [facility]  Rx for CPAP and clinicals sent to [facility] via [place]

## 2023-05-22 LAB
DME PARACHUTE DELIVERY DATE EXPECTED: NORMAL
DME PARACHUTE DELIVERY DATE REQUESTED: NORMAL
DME PARACHUTE DELIVERY NOTE: NORMAL
DME PARACHUTE ITEM DESCRIPTION: NORMAL
DME PARACHUTE ORDER STATUS: NORMAL
DME PARACHUTE SUPPLIER NAME: NORMAL
DME PARACHUTE SUPPLIER PHONE: NORMAL

## 2023-06-02 ENCOUNTER — OFFICE VISIT (OUTPATIENT)
Dept: FAMILY MEDICINE CLINIC | Facility: CLINIC | Age: 63
End: 2023-06-02

## 2023-06-02 VITALS
TEMPERATURE: 98.1 F | OXYGEN SATURATION: 98 % | DIASTOLIC BLOOD PRESSURE: 71 MMHG | WEIGHT: 172 LBS | SYSTOLIC BLOOD PRESSURE: 101 MMHG | HEIGHT: 55 IN | HEART RATE: 86 BPM | BODY MASS INDEX: 39.81 KG/M2

## 2023-06-02 DIAGNOSIS — G47.33 OSA (OBSTRUCTIVE SLEEP APNEA): ICD-10-CM

## 2023-06-02 DIAGNOSIS — J45.20 MILD INTERMITTENT ASTHMA WITHOUT COMPLICATION: ICD-10-CM

## 2023-06-02 DIAGNOSIS — Z12.31 BREAST CANCER SCREENING BY MAMMOGRAM: ICD-10-CM

## 2023-06-02 DIAGNOSIS — J45.909 UNCOMPLICATED ASTHMA, UNSPECIFIED ASTHMA SEVERITY, UNSPECIFIED WHETHER PERSISTENT: ICD-10-CM

## 2023-06-02 DIAGNOSIS — Z12.11 COLON CANCER SCREENING: ICD-10-CM

## 2023-06-02 DIAGNOSIS — I10 PRIMARY HYPERTENSION: Primary | ICD-10-CM

## 2023-06-02 DIAGNOSIS — E66.01 SEVERE OBESITY (BMI 35.0-39.9) WITH COMORBIDITY (HCC): ICD-10-CM

## 2023-06-02 DIAGNOSIS — I10 ESSENTIAL HYPERTENSION: ICD-10-CM

## 2023-06-02 DIAGNOSIS — H26.40 SECONDARY CATARACT, UNSPECIFIED LATERALITY, UNSPECIFIED SECONDARY CATARACT TYPE: ICD-10-CM

## 2023-06-02 DIAGNOSIS — E78.5 HYPERLIPIDEMIA, UNSPECIFIED HYPERLIPIDEMIA TYPE: ICD-10-CM

## 2023-06-02 DIAGNOSIS — E66.9 CLASS 2 OBESITY: ICD-10-CM

## 2023-06-02 PROBLEM — I95.1 ORTHOSTATIC HYPOTENSION: Status: ACTIVE | Noted: 2023-06-02

## 2023-06-02 PROBLEM — E66.812 CLASS 2 OBESITY: Status: ACTIVE | Noted: 2021-08-25

## 2023-06-02 LAB

## 2023-06-02 PROCEDURE — 99214 OFFICE O/P EST MOD 30 MIN: CPT | Performed by: FAMILY MEDICINE

## 2023-06-02 RX ORDER — MONTELUKAST SODIUM 10 MG/1
10 TABLET ORAL
Qty: 90 TABLET | Refills: 1 | Status: SHIPPED | OUTPATIENT
Start: 2023-06-02

## 2023-06-02 RX ORDER — CANDESARTAN CILEXETIL AND HYDROCHLOROTHIAZIDE 16; 12.5 MG/1; MG/1
0.5 TABLET ORAL DAILY
Qty: 45 TABLET | Refills: 1 | Status: SHIPPED | OUTPATIENT
Start: 2023-06-02

## 2023-06-02 RX ORDER — OMEGA-3-ACID ETHYL ESTERS 1 G/1
2 CAPSULE, LIQUID FILLED ORAL DAILY
COMMUNITY

## 2023-06-02 NOTE — PROGRESS NOTES
Name: Ishaan Ludwig      : 1960      MRN: [id number]  Encounter Provider: Augie Bhatti DO  Encounter Date: 2023   Encounter department: [address] [place] [address]  Primary hypertension  Assessment & Plan:  BP well controlled at 101/71, at goal  Continue current regimen of candesartan-HCTZ      2  Essential hypertension  -     candesartan-hydrochlorothiazide (ATACAND HCT) 16-12 5 MG per tablet; Take 0 5 tablets by mouth daily    3  Uncomplicated asthma, unspecified asthma severity, unspecified whether persistent  Assessment & Plan:  Well controlled  Does not use albuterol unless sick  Orders:  -     montelukast (SINGULAIR) 10 mg tablet; Take 1 tablet (10 mg total) by mouth daily at bedtime    4  TOM (obstructive sleep apnea)  Assessment & Plan:  Uses CPAP nightly  Awakens refreshed and denies fatigue  Follows with Sleep medicine  Discussed weight loss with lifestyle modifications  5  Hyperlipidemia, unspecified hyperlipidemia type  Assessment & Plan:  Reviewed FLP  Wnl except for LDL near optimal at 106  Continue low cholesterol diet  Orders:  -     candesartan-hydrochlorothiazide (ATACAND HCT) 16-12 5 MG per tablet; Take 0 5 tablets by mouth daily    6  Breast cancer screening by mammogram  -     Mammo screening bilateral w 3d & cad; Future; Expected date: 2023    7  Colon cancer screening  -     Ambulatory Referral to Gastroenterology; Future    8  Class 2 obesity  Assessment & Plan:  BMI Counseling: Body mass index is 39 98 kg/m²   The BMI is above normal  Nutrition recommendations include reducing portion sizes, decreasing overall calorie intake, 3-5 servings of fruits/vegetables daily, reducing fast food intake, consuming healthier snacks, decreasing soda and/or juice intake, moderation in carbohydrate intake, increasing intake of lean protein, reducing intake of saturated fat and trans fat and reducing intake of cholesterol  Exercise recommendations include moderate aerobic physical activity for 150 minutes/week  9  Severe obesity (BMI 35 0-39  9) with comorbidity (HCC)  -     candesartan-hydrochlorothiazide (ATACAND HCT) 16-12 5 MG per tablet; Take 0 5 tablets by mouth daily    10  Mild intermittent asthma without complication  Assessment & Plan:  Well controlled  Does not use albuterol unless sick  11  Secondary cataract, unspecified laterality, unspecified secondary cataract type  Assessment & Plan:  Follows with ophthalmology           Subjective     HPI   Suleiman Ornelas is a 58 y o  female who presents for 6 mo follow up of chronic conditions  Pt reports that she has Left eye macular degeneration and cataracts in right eye  She follows with ophthalmology for both eyes  Asthma  - not using albuterol    HTN  - denies headaches, chest pain, SOB  -pt has dizziness/light headedness when she stands up quickly  TOM  - Uses CPAP every night  - has refreshed sleep  - 5-6 hours continuous sleep  Review of Systems   Constitutional: Negative for activity change, fatigue, fever and unexpected weight change  HENT: Negative for sore throat  Eyes: Negative for redness and visual disturbance  Cardiovascular: Negative for chest pain and palpitations  Gastrointestinal: Negative for abdominal pain, constipation, diarrhea, nausea and vomiting  Genitourinary: Negative for dysuria, hematuria and menstrual problem  Skin: Negative for rash  Neurological: Negative for dizziness, syncope and headaches  Psychiatric/Behavioral: Negative for sleep disturbance  Past Medical History:   Diagnosis Date   • Anemia    • Hypertension      History reviewed  No pertinent surgical history    Family History   Problem Relation Age of Onset   • No Known Problems Mother    • No Known Problems Father    • No Known Problems Daughter    • No Known Problems Maternal Grandmother    • No Known Problems Maternal Grandfather • No Known Problems Paternal Grandmother    • No Known Problems Paternal Grandfather    • No Known Problems Half-Sister    • No Known Problems Maternal Aunt    • No Known Problems Maternal Aunt      Social History     Socioeconomic History   • Marital status: Single     Spouse name: None   • Number of children: None   • Years of education: None   • Highest education level: None   Occupational History   • None   Tobacco Use   • Smoking status: Never   • Smokeless tobacco: Never   Vaping Use   • Vaping Use: Never used   Substance and Sexual Activity   • Alcohol use: Never   • Drug use: Never   • Sexual activity: Not Currently     Partners: Male   Other Topics Concern   • None   Social History Narrative   • None     Social Determinants of Health     Financial Resource Strain: Low Risk  (10/28/2022)    Overall Financial Resource Strain (CARDIA)    • Difficulty of Paying Living Expenses: Not hard at all   Food Insecurity: No Food Insecurity (10/28/2022)    Hunger Vital Sign    • Worried About Running Out of Food in the Last Year: Never true    • Ran Out of Food in the Last Year: Never true   Transportation Needs: No Transportation Needs (10/28/2022)    PRAPARE - Transportation    • Lack of Transportation (Medical): No    • Lack of Transportation (Non-Medical):  No   Physical Activity: Not on file   Stress: Not on file   Social Connections: Not on file   Intimate Partner Violence: Not on file   Housing Stability: Low Risk  (10/28/2022)    Housing Stability Vital Sign    • Unable to Pay for Housing in the Last Year: No    • Number of [place] in the Last Year: 1    • Unstable Housing in the Last Year: No     Current Outpatient Medications on File Prior to Visit   Medication Sig   • albuterol (PROVENTIL HFA,VENTOLIN HFA) 90 mcg/act inhaler Inhale 1 puff every 6 (six) hours as needed for wheezing or shortness of breath   • CALCIUM CITRATE PO Take by mouth   • omega-3-acid ethyl esters (LOVAZA) 1 g capsule Take 2 g by "mouth in the morning   • travoprost (TRAVATAN-Z) 0 004 % ophthalmic solution INSTILL 1 DROP INTO BOTH EYES IN THE EVENING     Allergies   Allergen Reactions   • Sulfa Antibiotics Blisters     Immunization History   Administered Date(s) Administered   • COVID-19 J&J (Jose) vaccine 0 5 mL 03/16/2021   • COVID-19 MODERNA VACC 0 5 ML IM 11/26/2021       Objective     /71 (BP Location: Left arm, Patient Position: Sitting, Cuff Size: Standard)   Pulse 86   Temp 98 1 °F (36 7 °C) (Temporal)   Ht 4' 7\" (1 397 m)   Wt 78 kg (172 lb)   SpO2 98%   BMI 39 98 kg/m²     Physical Exam  Constitutional:       General: She is not in acute distress  Appearance: Normal appearance  She is obese  HENT:      Head: Normocephalic and atraumatic  Right Ear: Tympanic membrane, ear canal and external ear normal       Left Ear: Tympanic membrane, ear canal and external ear normal       Nose: Nose normal       Mouth/Throat:      Mouth: Mucous membranes are moist       Pharynx: Oropharynx is clear  Eyes:      Extraocular Movements: Extraocular movements intact  Conjunctiva/sclera: Conjunctivae normal       Pupils: Pupils are equal, round, and reactive to light  Cardiovascular:      Rate and Rhythm: Normal rate and regular rhythm  Pulses: Normal pulses  Heart sounds: Normal heart sounds  Pulmonary:      Effort: Pulmonary effort is normal  No respiratory distress  Breath sounds: Normal breath sounds  No wheezing, rhonchi or rales  Abdominal:      General: Bowel sounds are normal       Palpations: Abdomen is soft  Tenderness: There is no abdominal tenderness  Musculoskeletal:      Cervical back: Normal range of motion and neck supple  No tenderness  Right lower leg: No edema  Left lower leg: No edema  Lymphadenopathy:      Cervical: No cervical adenopathy  Skin:     General: Skin is warm and dry  Neurological:      General: No focal deficit present        Mental Status: She " is alert and oriented to person, place, and time  Gait: Gait normal    Psychiatric:         Mood and Affect: Mood normal          Behavior: Behavior normal          Thought Content:  Thought content normal          Judgment: Judgment normal        Lorna Pratt, DO

## 2023-06-05 LAB

## 2023-06-05 NOTE — ASSESSMENT & PLAN NOTE
Uses CPAP nightly  Awakens refreshed and denies fatigue  Follows with Sleep medicine  Discussed weight loss with lifestyle modifications

## 2023-06-05 NOTE — ASSESSMENT & PLAN NOTE
BMI Counseling: Body mass index is 39 98 kg/m²  The BMI is above normal  Nutrition recommendations include reducing portion sizes, decreasing overall calorie intake, 3-5 servings of fruits/vegetables daily, reducing fast food intake, consuming healthier snacks, decreasing soda and/or juice intake, moderation in carbohydrate intake, increasing intake of lean protein, reducing intake of saturated fat and trans fat and reducing intake of cholesterol  Exercise recommendations include moderate aerobic physical activity for 150 minutes/week

## 2023-06-07 ENCOUNTER — TELEPHONE (OUTPATIENT)
Dept: PULMONOLOGY | Facility: CLINIC | Age: 63
End: 2023-06-07

## 2023-07-27 ENCOUNTER — RA CDI HCC (OUTPATIENT)
Dept: OTHER | Facility: HOSPITAL | Age: 63
End: 2023-07-27

## 2023-07-27 NOTE — PROGRESS NOTES
[address] [facility] coding opportunities       Chart reviewed, no opportunity found: CHART REVIEWED, NO OPPORTUNITY FOUND        Patients Insurance        Commercial Insurance: [employer]

## 2023-08-08 ENCOUNTER — CONSULT (OUTPATIENT)
Dept: GASTROENTEROLOGY | Facility: AMBULARY SURGERY CENTER | Age: 63
End: 2023-08-08
Payer: COMMERCIAL

## 2023-08-08 VITALS
SYSTOLIC BLOOD PRESSURE: 120 MMHG | BODY MASS INDEX: 39.53 KG/M2 | WEIGHT: 170.8 LBS | HEART RATE: 73 BPM | HEIGHT: 55 IN | DIASTOLIC BLOOD PRESSURE: 74 MMHG | OXYGEN SATURATION: 98 %

## 2023-08-08 DIAGNOSIS — Z12.11 COLON CANCER SCREENING: ICD-10-CM

## 2023-08-08 DIAGNOSIS — K63.5 POLYP OF COLON, UNSPECIFIED PART OF COLON, UNSPECIFIED TYPE: ICD-10-CM

## 2023-08-08 DIAGNOSIS — K59.04 CHRONIC IDIOPATHIC CONSTIPATION: Primary | ICD-10-CM

## 2023-08-08 PROCEDURE — 99244 OFF/OP CNSLTJ NEW/EST MOD 40: CPT | Performed by: INTERNAL MEDICINE

## 2023-08-08 NOTE — PROGRESS NOTES
[facility] - Outpatient Consultation  Margarita Jackson 58 y.o. female MRN: [id number]  Encounter: [id number]      PCP: Cheyanne Almazan DO  Referring: Cheyanne Almazan DO  [address] [facility]  [address] [facility],  [address] [place] [address]      ASSESSMENT AND PLAN:      1. Colon polyps  Last colonoscopy more than 5 years ago at outside facility, noted to have colon polyps, full report not available for my review we will plan for repeat screening colonoscopy. - Ambulatory Referral to Gastroenterology  - Colonoscopy; Future  - sodium picosulfate, magnesium oxide, citric acid (Clenpiq) oral solution; Take 175 mL (1 bottle) the evening before the colonoscopy, between 5 PM and 9 PM, followed by a second 175 mL bottle 5 hours before the colonoscopy. Dispense: 350 mL; Refill: 0    2. Chronic idiopathic constipation  Discussed multifactorial pathophysiology with constipation-recommended increase dietary fiber, exercise, stress management  Her extensive lower abdominal surgical history may be contributing  Recommend to start psyllium fiber, with transition to MiraLAX if ineffective  ______________________________________________________________________    CC:  Chief Complaint   Patient presents with   • Screening Colonoscopy       HPI:      Patient is a 30-year-old female referred for screening colonoscopy, also with constipation. She has mild intermittent asthma, obstructive sleep apnea, HTN, HLD, prediabetes, T2DM. She is present with her cousin at today's visit, who contributes to history. Her last colonoscopy was performed more than 5 years ago in [place], she is noted to have colon polyps. She denies any family history of colon cancer. She is status postcholecystectomy, as well as perforated appendicitis, which was complicated by abdominal abscess in the remote past.  She does have bowel movements daily, and admits to increased fruit intake. However has had difficulty with straining.   She has not tried over-the-counter medications for this. She denies any rectal bleeding. REVIEW OF SYSTEMS:    CONSTITUTIONAL: Denies any fever, chills, rigors, and weight loss. HEENT: No earache or tinnitus. Denies hearing loss or visual disturbances. CARDIOVASCULAR: No chest pain or palpitations. RESPIRATORY: Denies any cough, hemoptysis, shortness of breath or dyspnea on exertion. GASTROINTESTINAL: As noted in the History of Present Illness. GENITOURINARY: No problems with urination. Denies any hematuria or dysuria. NEUROLOGIC: No dizziness or vertigo, denies headaches. MUSCULOSKELETAL: Denies any muscle or joint pain. SKIN: Denies skin rashes or itching. ENDOCRINE: Denies excessive thirst. Denies intolerance to heat or cold. PSYCHOSOCIAL: Denies depression or anxiety. Denies any recent memory loss. Historical Information   Past Medical History:   Diagnosis Date   • Anemia    • Hypertension      History reviewed. No pertinent surgical history.   Social History   Social History     Substance and Sexual Activity   Alcohol Use Never     Social History     Substance and Sexual Activity   Drug Use Never     Social History     Tobacco Use   Smoking Status Never   Smokeless Tobacco Never     Family History   Problem Relation Age of Onset   • No Known Problems Mother    • No Known Problems Father    • No Known Problems Daughter    • No Known Problems Maternal Grandmother    • No Known Problems Maternal Grandfather    • No Known Problems Paternal Grandmother    • No Known Problems Paternal Grandfather    • No Known Problems Half-Sister    • No Known Problems Maternal Aunt    • No Known Problems Maternal Aunt        Meds/Allergies       Current Outpatient Medications:   •  albuterol (PROVENTIL HFA,VENTOLIN HFA) 90 mcg/act inhaler  •  CALCIUM CITRATE PO  •  candesartan-hydrochlorothiazide (ATACAND HCT) 16-12.5 MG per tablet  •  montelukast (SINGULAIR) 10 mg tablet  •  omega-3-acid ethyl esters (LOVAZA) 1 g capsule  •  travoprost (TRAVATAN-Z) 0.004 % ophthalmic solution    Allergies   Allergen Reactions   • Sulfa Antibiotics Blisters           Objective     Blood pressure 120/74, pulse 73, height 4' 7" (1.397 m), weight 77.5 kg (170 lb 12.8 oz), SpO2 98 %. Body mass index is 39.7 kg/m². PHYSICAL EXAM:      General Appearance:   Alert, cooperative, no distress   HEENT:   Normocephalic, atraumatic, anicteric. Neck:  Supple, symmetrical, trachea midline   Lungs:   Clear to auscultation bilaterally; no rales, rhonchi or wheezing; respirations unlabored    Heart[de-identified]   Regular rate and rhythm; no murmur, rub, or gallop. Abdomen:   Soft, non-tender, non-distended; normal bowel sounds; no masses, no organomegaly    Genitalia:   Deferred    Rectal:   Deferred    Extremities:  No cyanosis, clubbing or edema    Pulses:  2+ and symmetric    Skin:  No jaundice, rashes, or lesions    Lymph nodes:  No palpable cervical lymphadenopathy        Lab Results:     Lab Results   Component Value Date    HGB 13.1 11/19/2021    HCT 41.7 11/19/2021       Lab Results   Component Value Date    K 3.9 10/15/2022     10/15/2022    CO2 26 10/15/2022    BUN 13 10/15/2022    CREATININE 0.78 10/15/2022    GLUF 109 (H) 10/15/2022    CALCIUM 9.7 10/15/2022    EGFR 81 10/15/2022       No results found for: "INR", "PROTIME"      Radiology Results:   No results found. Portions of the record may have been created with voice recognition software. Occasional wrong word or "sound a like" substitutions may have occurred due to the inherent limitations of voice recognition software. Read the chart carefully and recognize, using context, where substitutions have occurred.

## 2023-08-08 NOTE — PATIENT INSTRUCTIONS
Constipation   WHAT YOU NEED TO KNOW:   Constipation means you have hard, dry bowel movements, or you go longer than usual between bowel movements. Medicines:   Medicine  such as a laxative may help relax and loosen your intestines to help you have a bowel movement. These medications are safe and help your bowels to move regularly to improve your symptoms. Take your medicine as directed. Contact your healthcare provider if you think your medicine is not helping or if you have side effects. Tell him of her if you are allergic to any medicine. Keep a list of the medicines, vitamins, and herbs you take. Include the amounts, and when and why you take them. Bring the list or the pill bottles to follow-up visits. Carry your medicine list with you in case of an emergency. Prevent constipation:   Drink liquids as directed. You may need to drink extra liquids to help soften and move your bowels. Ask how much liquid to drink each day and which liquids are best for you. Eat high-fiber foods. This may help decrease constipation by adding bulk to your bowel movements. High-fiber foods include fruit, vegetables, whole-grain breads and cereals, and beans. Your healthcare provider or dietitian can help you create a high-fiber meal plan. Your provider may also recommend a fiber supplement if you cannot get enough fiber from food. Exercise regularly. Regular physical activity can help stimulate your intestines. Walking is a good exercise to prevent or relieve constipation. Ask which exercises are best for you. Talk to your healthcare provider about your medicines. Certain medicines, such as opioids, can cause constipation. Your provider may be able to make medicine changes. For example, he or she may change the kind of medicine, or change when you take it. Follow up with your doctor as directed:  Write down your questions so you remember to ask them during your visits.    © Copyright [employer] 2021 Information is for [employer] use only and may not be sold, redistributed or otherwise used for commercial purposes. All illustrations and images included in CareNotes® are the copyrighted property of [employer] or [address] [place] [address]  The above information is an  only. It is not intended as medical advice for individual conditions or treatments. Talk to your doctor, nurse or pharmacist before following any medical regimen to see if it is safe and effective for you. What is constipation? Constipation happens when fecal material (stool) moves through the large bowel (colon) too slowly. The fluid portion of the stool is absorbed back into the body, so the stool becomes hard and dry. This makes it difficult to pass the stool. What causes constipation? Poor nutrition, inadequate sleep, limited exercise, anxiety, emotional stress and age may cause constipation. Certain disease also can cause constipation, and are usually associated with a sudden change in bowel habits, pain, weight loss, fatigue or bloody stools. Contact your doctor if you experience these symptoms. Some medications cause constipation - talk to your doctor if you think your medications are causing constipation. Can eating more fiber help with constipation? Yes. Fiber is the part of plant food that is not digested. There are two kinds of fiber: soluble and insoluble. Soluble fiber gives stool bulk. Foods that are good sources of soluble fiber include apples, bananas, barley, oats, and beans. Insoluble fiber helps speed up the transit of food in the digestive tract and helps prevent constipation. Good sources of insoluble fiber include whole grains, most vegetables, wheat bran, and legumes. Foods that have fiber contain both soluble and insoluble fibers. A good goal for dietary fiber is a total of about 20 to 30 grams each day. GUIDELINES TO TREAT CONSTIPATION    Nutrition  Eat three meals each day.  Do not skip meals. Gradually increase the amount of high-fiber foods in your diet. Choose more whole grain breads, cereals and rice. Select more raw fruits and vegetables -- eat the peel, if appropriate. Read food labels and look for the "dietary fiber" content of foods. Good sources have 2 grams of fiber or more. Drink six to eight glasses of water each day. Limit highly refined and processed foods. Exercise and Sleep  Exercise regularly. Try to do weight-bearing exercise, such as walking, three or more times each week. Go to sleep at a regular time each night. Make sure you get enough sleep. Stress and Anxiety  Try to limit stress in your life. Go for a short walk when you feel anxiety or stress increasing. [id number] [facility] specialists have reviewed this information. It is for educational purposes only and is not intended to replace the advice of your doctor or other health care provider. We encourage you to discuss any questions or concerns you may have with your provider. MEDICATIONS I RECOMMEND OVER THE COUNTER:    For your bowel habits, as we discussed during today's visit,  - I would recommend starting psyllium, fiber supplementation. This can be done with use of Metamucil or Benefiber fiber, which can be purchased over-the-counter. I would recommend starting slow with 1 tsp mixed into a large glass of water, once a day, and increasing to goal of 1 tbsp mixed into a large glass of water per day. - this helps with both diarrhea and constipation, helping to bulk the stool, and should not cause constipation or diarrhea, but treat both  - the only side effect of this can be bloating, if this occurs, cut down on the dose, and increase your water intake    DO THIS FOR 2 WEEKS, IF NO BENEFIT,    You have been recommended miralax (polyethylene glycol) for treatment of your CONSTIPATION. Start 1 capful daily. Take this dose x 3 days. If your symptoms are improved, great!  Continue this dose daily.    If you have diarrhea (stools are loose, associated with accidents, or urgency) with this dose, cut down to 1/2 capful daily. Continue to titrate down until you find the dose for you. This might be 1 tbsp daily. Wait 3 days before making a change. If you have continued constipation with 1 capful daily, you may need a higher dose. Start with 1.5 capfuls daily and titrate upward. Eg might need 1 capful twice daily. There is no maximum dose, whatever works for you.  Again, wait 3 days before making a change.    - gas-x (simethicone) or BEANO over the counter for symptoms of bloating    Scheduled date of colonoscopy (as of today):  10/16/23  Physician performing colonoscopy:  Dr Cindi Doyle  Location of colonoscopy:[facility]  Bowel prep reviewed with patient:  clenpiq  Instructions reviewed with patient by:  Татьяна LORENZO  Clearances:  n/a

## 2023-08-10 RX ORDER — SODIUM PICOSULFATE, MAGNESIUM OXIDE, AND ANHYDROUS CITRIC ACID 12; 3.5; 1 G/175ML; G/175ML; MG/175ML
LIQUID ORAL
Qty: 350 ML | Refills: 0 | Status: SHIPPED | OUTPATIENT
Start: 2023-08-10

## 2023-08-10 NOTE — TELEPHONE ENCOUNTER
Called patient and spoke to her with the help of the language line interpretor Ramila Barajas [phone]. Informed patient that I will be emailing the bowel prep instructions to her. She verbalized understanding. I also explained to her Provider's recommendation of the Miralax 1 week prior to her procedure. She again verbalized understanding. Emailed Miralax Dulcolax prep in [address] [facility] [address] to her email.

## 2023-08-30 ENCOUNTER — OFFICE VISIT (OUTPATIENT)
Dept: SLEEP CENTER | Facility: CLINIC | Age: 63
End: 2023-08-30
Payer: COMMERCIAL

## 2023-08-30 VITALS
BODY MASS INDEX: 39.34 KG/M2 | DIASTOLIC BLOOD PRESSURE: 72 MMHG | WEIGHT: 170 LBS | SYSTOLIC BLOOD PRESSURE: 124 MMHG | HEIGHT: 55 IN

## 2023-08-30 DIAGNOSIS — G47.09 OTHER INSOMNIA: ICD-10-CM

## 2023-08-30 DIAGNOSIS — I10 PRIMARY HYPERTENSION: ICD-10-CM

## 2023-08-30 DIAGNOSIS — G47.33 OSA (OBSTRUCTIVE SLEEP APNEA): Primary | ICD-10-CM

## 2023-08-30 DIAGNOSIS — E66.9 OBESITY (BMI 30-39.9): ICD-10-CM

## 2023-08-30 DIAGNOSIS — J45.909 UNCOMPLICATED ASTHMA, UNSPECIFIED ASTHMA SEVERITY, UNSPECIFIED WHETHER PERSISTENT: ICD-10-CM

## 2023-08-30 DIAGNOSIS — G47.62 NOCTURNAL LEG CRAMPS: ICD-10-CM

## 2023-08-30 DIAGNOSIS — G25.81 RLS (RESTLESS LEGS SYNDROME): ICD-10-CM

## 2023-08-30 PROCEDURE — 99214 OFFICE O/P EST MOD 30 MIN: CPT | Performed by: INTERNAL MEDICINE

## 2023-08-30 NOTE — PROGRESS NOTES
Follow-Up Note - [address]  58 y.o. female  TAMAR:[birth year]  XW:[id number]  DOS:8/30/2023    CC: I saw this patient for follow-up in clinic today for Sleep disordered breathing, Coexisting Sleep and Medical Problems. Interval changes: Treatment was initiated using a ResMed machine. Patient had a split sleep study and is here to review results and to initiate therapy. The diagnostic portion demonstrated: AHI of 75.9 per hour, Minimum oxygen saturation was 86%. During the therapeutic portion of the study, his sleep disordered breathing was successfully remediated with nasal CPAP at 13 cm H2O. PFSH, Problem List, Medications & Allergies were reviewed in EMR. She  has a past medical history of Anemia and Hypertension. She has a current medication list which includes the following prescription(s): albuterol, calcium citrate, candesartan-hydrochlorothiazide, clenpiq, montelukast, omega-3-acid ethyl esters, and travoprost.    PHYSIOLOGICAL DATA REVIEW : Using PAP > 4 hours/night 98%. Estimated FRANK 1.5/hour with pressure of 13cm Jillian@Snappli percentile; patient has not been using non FDA approved devices to sanitize the machine. INTERPRETATION: Compliance is excellent; Pressure setting is:optimal; ;   SUBJECTIVE: With respect to use of PAP, Gabi Lebron  is experiencing some adverse effects:nasal congestion and pressure too high. She derives benefit. Is satisfied with sleep and daytime function. Sleep Routine: Gabi Lebron reports getting 7 hrs sleep; she has no difficulty initiating or maintaining sleep . She arises spontaneously and feels more refreshed since on Rx. Gabi Lebron reports significantly improved excessive daytime sleepiness,. She rated [herself] at Total score: 2 /24 on the [place] Sleepiness Scale. Other issues: Restless legs symptoms are not disturbing sleep. Chin Redhead However, she is reporting pressure effect causing ear discomfort. Habits:[ reports that she has never smoked.  She has never used smokeless tobacco.], [ reports no history of alcohol use.], [ reports no history of drug use.], Caffeine use:limited; Exercise routine: none. ROS: Significant for weight reduction. He has postnasal drip due to allergies and recently asthma has been worse. Kayley Girt EXAM: /72   Ht 4' 7" (1.397 m)   Wt 77.1 kg (170 lb)   BMI 39.51 kg/m²     Wt Readings from Last 3 Encounters:   08/30/23 77.1 kg (170 lb)   08/08/23 77.5 kg (170 lb 12.8 oz)   06/02/23 78 kg (172 lb)      Patient is well groomed; well appearing. CNS: Alert, orientated, speech clear & coherent  Psych: cooperative and in no distress. Mental state: Appears normal.  H&N: EOMI; NC/AT: No facial pressure marks, no rashes. Skin/Extrem: col & hydration normal; no edema  Resp: Respiratory effort is normal  Physical findings otherwise essentially unchanged from previous. IMPRESSION: Problem List Items & Comorbidities Addressed this Visit    1. TOM (obstructive sleep apnea)  PAP DME Pressure Change    PAP DME Resupply/Reorder      2. Other insomnia        3. RLS (restless legs syndrome)        4. Nocturnal leg cramps        5. Uncomplicated asthma, unspecified asthma severity, unspecified whether persistent        6. Primary hypertension        7. Obesity (BMI 30-39.9)        1-4 improved    PLAN:  1. I reviewed results of prior studies and physiologic data with the patient. 2. I discussed treatment options with risks and benefits. 3. Treatment with  PAP is medically necessary and Zoe Jon is agreable to continue use. 4. Care of equipment, methods to improve comfort using PAP and importance of compliance with therapy were discussed. 5. Pressure setting: adjust 11-13 cmH2O.    6. Rx provided to replace supplies and Care coordinated with DME provider. 7. Nasal symptoms may improve with regular nasal saline rinse up to twice a day, followed by topical nasal steroid (e..g. OTC Flonase, Nasacort) once a day if necessary.   8. Discussed strategies for weight reduction. 9. Follow-up is advised in 1 year or sooner if needed to monitor progress, compliance and to adjust therapy. Thank you for allowing me to participate in the care of this patient. Sincerely,     Authenticated electronically on 06/65/00   Board Certified Specialist     Portions of the record may have been created with voice recognition software. Occasional wrong word or "sound a like" substitutions may have occurred due to the inherent limitations of voice recognition software. There may also be notations and random deletions of words or characters from malfunctioning software. Read the chart carefully and recognize, using context, where substitutions/deletions have occurred.

## 2023-08-30 NOTE — PATIENT INSTRUCTIONS
Nasal symptoms may [improve] with regular nasal saline rinse up to twice a day, followed by topical nasal steroid (e..g. OTC Flonase, Nasacort) once a day if necessary. Nursing Support:  When: Monday through Friday 7A-5PM except holidays  Where: Our direct line is [phone]. If you are having a true emergency please call 911. In the event that the line is busy or it is after hours please leave a voice message and we will return your call. Please speak clearly, leaving your full name, birth date, best number to reach you and the reason for your call. Medication refills: We will need the name of the medication, the dosage, the ordering provider, whether you get a 30 or 90 day refill, and the pharmacy name and address. Medications will be ordered by the provider only. Nurses cannot call in prescriptions. Please allow 7 days for medication refills. Physician requested updates: If your provider requested that you call with an update after starting medication, please be ready to provide us the medication and dosage, what time you take your medication, the time you attempt to fall asleep, time you fall asleep, when you wake up, and what time you get out of bed. Sleep Study Results: We will contact you with sleep study results and/or next steps after the physician has reviewed your testing.

## 2023-08-31 ENCOUNTER — TELEPHONE (OUTPATIENT)
Dept: SLEEP CENTER | Facility: CLINIC | Age: 63
End: 2023-08-31

## 2023-09-01 LAB
DME PARACHUTE DELIVERY DATE ACTUAL: NORMAL
DME PARACHUTE DELIVERY DATE ACTUAL: NORMAL
DME PARACHUTE DELIVERY DATE REQUESTED: NORMAL
DME PARACHUTE DELIVERY DATE REQUESTED: NORMAL
DME PARACHUTE ITEM DESCRIPTION: NORMAL
DME PARACHUTE ORDER STATUS: NORMAL
DME PARACHUTE ORDER STATUS: NORMAL
DME PARACHUTE SUPPLIER NAME: NORMAL
DME PARACHUTE SUPPLIER NAME: NORMAL
DME PARACHUTE SUPPLIER PHONE: NORMAL
DME PARACHUTE SUPPLIER PHONE: NORMAL

## 2023-09-18 ENCOUNTER — TELEPHONE (OUTPATIENT)
Dept: FAMILY MEDICINE CLINIC | Facility: CLINIC | Age: 63
End: 2023-09-18

## 2023-09-18 NOTE — TELEPHONE ENCOUNTER
Patient needs to get a mammogram for 2023 and the referral sent is 2024 when patient tried to schedule appointment for   [address] [facility]     The date needs to be changed to 2023    Patient can be reached at [phone] Benjy Gallardo(NP)

## 2023-09-19 DIAGNOSIS — Z12.31 ENCOUNTER FOR SCREENING MAMMOGRAM FOR MALIGNANT NEOPLASM OF BREAST: Primary | ICD-10-CM

## 2023-09-29 ENCOUNTER — HOSPITAL ENCOUNTER (OUTPATIENT)
Dept: RADIOLOGY | Facility: IMAGING CENTER | Age: 63
End: 2023-09-29
Payer: COMMERCIAL

## 2023-09-29 VITALS — BODY MASS INDEX: 39.34 KG/M2 | WEIGHT: 169.97 LBS | HEIGHT: 55 IN

## 2023-09-29 DIAGNOSIS — Z12.31 ENCOUNTER FOR SCREENING MAMMOGRAM FOR MALIGNANT NEOPLASM OF BREAST: ICD-10-CM

## 2023-09-29 PROCEDURE — 77063 BREAST TOMOSYNTHESIS BI: CPT

## 2023-09-29 PROCEDURE — 77067 SCR MAMMO BI INCL CAD: CPT

## 2023-10-03 ENCOUNTER — OFFICE VISIT (OUTPATIENT)
Dept: FAMILY MEDICINE CLINIC | Facility: CLINIC | Age: 63
End: 2023-10-03

## 2023-10-03 VITALS
BODY MASS INDEX: 39.84 KG/M2 | HEART RATE: 64 BPM | SYSTOLIC BLOOD PRESSURE: 116 MMHG | WEIGHT: 171.4 LBS | OXYGEN SATURATION: 98 % | DIASTOLIC BLOOD PRESSURE: 63 MMHG | TEMPERATURE: 97.7 F

## 2023-10-03 DIAGNOSIS — R73.03 PREDIABETES: ICD-10-CM

## 2023-10-03 DIAGNOSIS — Z23 ENCOUNTER FOR IMMUNIZATION: ICD-10-CM

## 2023-10-03 DIAGNOSIS — Z00.00 ANNUAL PHYSICAL EXAM: Primary | ICD-10-CM

## 2023-10-03 DIAGNOSIS — Z13.6 SCREENING FOR CARDIOVASCULAR CONDITION: ICD-10-CM

## 2023-10-03 DIAGNOSIS — R40.0 DAYTIME SLEEPINESS: ICD-10-CM

## 2023-10-03 DIAGNOSIS — I10 ESSENTIAL HYPERTENSION: ICD-10-CM

## 2023-10-03 DIAGNOSIS — E66.9 CLASS 2 OBESITY: ICD-10-CM

## 2023-10-03 DIAGNOSIS — G47.33 OSA (OBSTRUCTIVE SLEEP APNEA): ICD-10-CM

## 2023-10-03 DIAGNOSIS — J30.2 SEASONAL ALLERGIC RHINITIS, UNSPECIFIED TRIGGER: ICD-10-CM

## 2023-10-03 PROBLEM — H40.9 GLAUCOMA OF BOTH EYES: Status: ACTIVE | Noted: 2022-10-03

## 2023-10-03 PROCEDURE — 99214 OFFICE O/P EST MOD 30 MIN: CPT | Performed by: FAMILY MEDICINE

## 2023-10-03 PROCEDURE — 99396 PREV VISIT EST AGE 40-64: CPT | Performed by: FAMILY MEDICINE

## 2023-10-03 RX ORDER — FLUTICASONE PROPIONATE 50 MCG
1 SPRAY, SUSPENSION (ML) NASAL DAILY
Qty: 11.1 ML | Refills: 2 | Status: SHIPPED | OUTPATIENT
Start: 2023-10-03

## 2023-10-03 NOTE — ASSESSMENT & PLAN NOTE
Pt states she is doing well at baseline state of health and without any acute concerns or questions at this visit. - Screening for cardiovascular disease: yearly BMP and Lipid panel ordered today  - ASCVD score: 5%  - Colonoscopy: Order in, reminded pt. - PAP/HPV: last 2020, neg HPV and Neg pap. Pt will age out of screening next year. - Mammogram: 9/29/23 - results not yet final  - DEXA: due at age 71  - Smoking cessation: never smoker  - HIV and Hep C screenings: negative  - Vaccinations: Flu recommend getting at pharmacy, clinic does not have. TDAP last 2015 in [place] per Pt, due in 2025  - Depression screening: PHQ score 0 (10/03/23), neg  - Obesity if BMI >30  - Counseled the patient on healthy lifestyle habits including weight loss, diet, and exercise.

## 2023-10-03 NOTE — ASSESSMENT & PLAN NOTE
Pt has blurry vision intermittently especially when reading or using her phone. F/u appt with Ophtho on 10/11.

## 2023-10-03 NOTE — PROGRESS NOTES
[facility]    NAME: Sparkle Rose  AGE: 58 y.o. SEX: female  : 1960     DATE: 10/3/2023     Assessment and Plan:     Problem List Items Addressed This Visit        Respiratory    TOM (obstructive sleep apnea)     Uses CPAP nightly. Awakens refreshed and denies fatigue. Follows with Sleep medicine. Discussed weight loss with lifestyle modifications. Other    Prediabetes     Will follow up yearly lab work. Relevant Orders    Hemoglobin A1C    Class 2 obesity     BMI Counseling: Body mass index is 39.84 kg/m². The BMI is above normal. Nutrition recommendations include reducing portion sizes, decreasing overall calorie intake, 3-5 servings of fruits/vegetables daily and reducing fast food intake. Exercise recommendations include moderate aerobic physical activity for 150 minutes/week, exercising 3-5 times per week and strength training exercises. F/u weight in 3 months. If not losing weight adequately with lifestyle changes, consider weight loss medication. Options briefly discussed today. Annual physical exam - Primary     Pt states she is doing well at baseline state of health and without any acute concerns or questions at this visit. - Screening for cardiovascular disease: yearly BMP and Lipid panel ordered today  - ASCVD score: 5%  - Colonoscopy: Order in, reminded pt. - PAP/HPV: last , neg HPV and Neg pap. Pt will age out of screening next year. - Mammogram: 22 - results not yet final  - DEXA: due at age 71  - Smoking cessation: never smoker  - HIV and Hep C screenings: negative  - Vaccinations: Flu recommend getting at pharmacy, clinic does not have.  TDAP last  in [place] per Pt, due in   - Depression screening: PHQ score 0 (10/03/23), neg  - Obesity if BMI >30  - Counseled the patient on healthy lifestyle habits including weight loss, diet, and exercise. Other Visit Diagnoses     Screening for cardiovascular condition        Relevant Orders    Lipid panel    Essential hypertension        Relevant Orders    Lipid panel    Albumin / creatinine urine ratio    Basic metabolic panel    Encounter for immunization        BMI 39.0-39.9,adult        Daytime sleepiness        Relevant Orders    TSH, 3rd generation with Free T4 reflex    Seasonal allergic rhinitis, unspecified trigger        Relevant Medications    fluticasone (FLONASE) 50 mcg/act nasal spray          Immunizations and preventive care screenings were discussed with patient today. Appropriate education was printed on patient's after visit summary. Counseling:  Alcohol/drug use: discussed moderation in alcohol intake, the recommendations for healthy alcohol use, and avoidance of illicit drug use. Dental Health: discussed importance of regular tooth brushing, flossing, and dental visits. Injury prevention: discussed safety/seat belts, safety helmets, smoke detectors, carbon dioxide detectors, and smoking near bedding or upholstery. · Exercise: the importance of regular exercise/physical activity was discussed. Recommend exercise 3-5 times per week for at least 30 minutes. Return in 3 months (on 1/3/2024) for weight check, consider medication. Chief Complaint:     Chief Complaint   Patient presents with   • Physical Exam     Experiencing tingling on legs for the past 2 weeks now, a crawling sensation during the night when sleeping. History of Present Illness:     Adult Annual Physical   Patient here for a comprehensive physical exam. The patient reports no problems. Diet and Physical Activity  · Diet/Nutrition: poor diet, high fat diet and consuming 3-5 servings of fruits/vegetables daily. · Exercise: no formal exercise.       Depression Screening  PHQ-2/9 Depression Screening    Little interest or pleasure in doing things: 0 - not at all  Feeling down, depressed, or hopeless: 0 - not at all  PHQ-2 Score: 0  PHQ-2 Interpretation: Negative depression screen       General Health   · Sleep: Wake up in the middle of the night (pee x1, tingling); not gasping for air. Pt uses CPAP. · Hearing: normal - bilateral.  · Vision: goes for regular eye exams. Glaucoma b/l eyes. 10/11 appt for eye pressure. Drops nightly. · Dental: no dental visits for >1 year. /GYN Health  · Patient is: postmenopausal  · Last menstrual period: 52 yo     Review of Systems:     Review of Systems   Constitutional: Negative for activity change, chills, fatigue, fever and unexpected weight change. HENT: Negative for ear pain and sore throat. Eyes: Negative for pain, redness and visual disturbance. Respiratory: Negative for cough and shortness of breath. Cardiovascular: Negative for chest pain and palpitations. Gastrointestinal: Negative for abdominal pain, constipation, diarrhea, nausea and vomiting. Genitourinary: Negative for dysuria, hematuria and menstrual problem. Musculoskeletal: Negative for arthralgias and back pain. Skin: Negative for color change and rash. Neurological: Negative for dizziness, seizures, syncope and headaches. All other systems reviewed and are negative.      Past Medical History:     Past Medical History:   Diagnosis Date   • Anemia    • Hypertension       Past Surgical History:     Past Surgical History:   Procedure Laterality Date   • APPENDECTOMY  10/16/1987   • CHOLECYSTECTOMY  10/16/1986      Social History:     Social History     Socioeconomic History   • Marital status: Single     Spouse name: None   • Number of children: None   • Years of education: None   • Highest education level: None   Occupational History   • None   Tobacco Use   • Smoking status: Never   • Smokeless tobacco: Never   Vaping Use   • Vaping Use: Never used   Substance and Sexual Activity   • Alcohol use: Yes     Comment: socially   • Drug use: Never   • Sexual activity: Not Currently     Partners: Male   Other Topics Concern   • None   Social History Narrative   • None     Social Determinants of Health     Financial Resource Strain: Low Risk  (10/28/2022)    Overall Financial Resource Strain (CARDIA)    • Difficulty of Paying Living Expenses: Not hard at all   Food Insecurity: No Food Insecurity (10/28/2022)    Hunger Vital Sign    • Worried About Running Out of Food in the Last Year: Never true    • Ran Out of Food in the Last Year: Never true   Transportation Needs: No Transportation Needs (10/28/2022)    PRAPARE - Transportation    • Lack of Transportation (Medical): No    • Lack of Transportation (Non-Medical):  No   Physical Activity: Not on file   Stress: Not on file   Social Connections: Not on file   Intimate Partner Violence: Not on file   Housing Stability: Low Risk  (10/28/2022)    Housing Stability Vital Sign    • Unable to Pay for Housing in the Last Year: No    • Number of Places Lived in the Last Year: 1    • Unstable Housing in the Last Year: No      Family History:     Family History   Problem Relation Age of Onset   • No Known Problems Mother    • No Known Problems Father    • No Known Problems Daughter    • No Known Problems Maternal Grandmother    • No Known Problems Maternal Grandfather    • No Known Problems Paternal Grandmother    • No Known Problems Paternal Grandfather    • No Known Problems Half-Sister    • No Known Problems Maternal Aunt    • No Known Problems Maternal Aunt       Current Medications:     Current Outpatient Medications   Medication Sig Dispense Refill   • albuterol (PROVENTIL HFA,VENTOLIN HFA) 90 mcg/act inhaler Inhale 1 puff every 6 (six) hours as needed for wheezing or shortness of breath 18 g 2   • CALCIUM CITRATE PO Take by mouth     • candesartan-hydrochlorothiazide (ATACAND HCT) 16-12.5 MG per tablet Take 0.5 tablets by mouth daily 45 tablet 1   • Clenpiq oral solution TAKE 175 ML (1 BOTTLE) THE EVENING BEFORE THE COLONOSCOPY, BETWEEN 5 PM AND 9 PM, FOLLOWED BY A SECOND 175 ML BOTTLE 5 HOURS BEFORE THE COLONOSCOPY. 350 mL 0   • fluticasone (FLONASE) 50 mcg/act nasal spray 1 spray into each nostril daily 11.1 mL 2   • montelukast (SINGULAIR) 10 mg tablet Take 1 tablet (10 mg total) by mouth daily at bedtime 90 tablet 1   • omega-3-acid ethyl esters (LOVAZA) 1 g capsule Take 2 g by mouth in the morning     • travoprost (TRAVATAN-Z) 0.004 % ophthalmic solution INSTILL 1 DROP INTO BOTH EYES IN THE EVENING       No current facility-administered medications for this visit. Allergies: Allergies   Allergen Reactions   • Sulfa Antibiotics Blisters      Physical Exam:     /63 (BP Location: Left arm, Patient Position: Sitting, Cuff Size: Standard)   Pulse 64   Temp 97.7 °F (36.5 °C) (Temporal)   Wt 77.7 kg (171 lb 6.4 oz)   SpO2 98%   BMI 39.84 kg/m²     Physical Exam  Vitals and nursing note reviewed. Constitutional:       General: She is not in acute distress. Appearance: Normal appearance. She is well-developed. She is obese. She is not toxic-appearing. HENT:      Head: Normocephalic and atraumatic. Right Ear: Tympanic membrane, ear canal and external ear normal.      Left Ear: Tympanic membrane, ear canal and external ear normal.      Nose: Nose normal.      Mouth/Throat:      Mouth: Mucous membranes are moist.      Pharynx: Oropharynx is clear. Eyes:      Extraocular Movements: Extraocular movements intact. Conjunctiva/sclera: Conjunctivae normal.      Pupils: Pupils are equal, round, and reactive to light. Cardiovascular:      Rate and Rhythm: Normal rate and regular rhythm. Heart sounds: Normal heart sounds. No murmur heard. No friction rub. No gallop. Pulmonary:      Effort: Pulmonary effort is normal. No respiratory distress. Breath sounds: Normal breath sounds. No wheezing, rhonchi or rales. Abdominal:      General: Bowel sounds are normal.      Palpations: Abdomen is soft.       Tenderness: There is no abdominal tenderness. Musculoskeletal:         General: Normal range of motion. Cervical back: Neck supple. Right lower leg: No edema. Left lower leg: No edema. Lymphadenopathy:      Cervical: No cervical adenopathy. Skin:     General: Skin is warm and dry. Neurological:      General: No focal deficit present. Mental Status: She is alert. Gait: Gait normal.   Psychiatric:         Behavior: Behavior normal.         Thought Content:  Thought content normal.         Judgment: Judgment normal.          DO Fareed Taylor

## 2023-10-03 NOTE — ASSESSMENT & PLAN NOTE
Uses CPAP nightly. Awakens refreshed and denies fatigue. Follows with Sleep medicine. Discussed weight loss with lifestyle modifications.

## 2023-10-07 ENCOUNTER — APPOINTMENT (OUTPATIENT)
Dept: LAB | Age: 63
End: 2023-10-07
Payer: COMMERCIAL

## 2023-10-07 DIAGNOSIS — R73.03 PREDIABETES: ICD-10-CM

## 2023-10-07 DIAGNOSIS — Z13.6 SCREENING FOR CARDIOVASCULAR CONDITION: ICD-10-CM

## 2023-10-07 DIAGNOSIS — I10 ESSENTIAL HYPERTENSION: ICD-10-CM

## 2023-10-07 DIAGNOSIS — R40.0 DAYTIME SLEEPINESS: ICD-10-CM

## 2023-10-07 LAB
ANION GAP SERPL CALCULATED.3IONS-SCNC: 5 MMOL/L
BUN SERPL-MCNC: 14 MG/DL (ref 5–25)
CALCIUM SERPL-MCNC: 9.8 MG/DL (ref 8.4–10.2)
CHLORIDE SERPL-SCNC: 103 MMOL/L (ref 96–108)
CHOLEST SERPL-MCNC: 205 MG/DL
CO2 SERPL-SCNC: 30 MMOL/L (ref 21–32)
CREAT SERPL-MCNC: 0.72 MG/DL (ref 0.6–1.3)
CREAT UR-MCNC: 134.6 MG/DL
EST. AVERAGE GLUCOSE BLD GHB EST-MCNC: 131 MG/DL
GFR SERPL CREATININE-BSD FRML MDRD: 88 ML/MIN/1.73SQ M
GLUCOSE P FAST SERPL-MCNC: 100 MG/DL (ref 65–99)
HBA1C MFR BLD: 6.2 %
HDLC SERPL-MCNC: 65 MG/DL
LDLC SERPL CALC-MCNC: 106 MG/DL (ref 0–100)
MICROALBUMIN UR-MCNC: <7 MG/L
MICROALBUMIN/CREAT 24H UR: <5 MG/G CREATININE (ref 0–30)
NONHDLC SERPL-MCNC: 140 MG/DL
POTASSIUM SERPL-SCNC: 3.8 MMOL/L (ref 3.5–5.3)
SODIUM SERPL-SCNC: 138 MMOL/L (ref 135–147)
TRIGL SERPL-MCNC: 172 MG/DL
TSH SERPL DL<=0.05 MIU/L-ACNC: 2.98 UIU/ML (ref 0.45–4.5)

## 2023-10-07 PROCEDURE — 36415 COLL VENOUS BLD VENIPUNCTURE: CPT

## 2023-10-07 PROCEDURE — 82043 UR ALBUMIN QUANTITATIVE: CPT

## 2023-10-07 PROCEDURE — 82570 ASSAY OF URINE CREATININE: CPT

## 2023-10-07 PROCEDURE — 83036 HEMOGLOBIN GLYCOSYLATED A1C: CPT

## 2023-10-07 PROCEDURE — 80048 BASIC METABOLIC PNL TOTAL CA: CPT

## 2023-10-07 PROCEDURE — 84443 ASSAY THYROID STIM HORMONE: CPT

## 2023-10-07 PROCEDURE — 80061 LIPID PANEL: CPT

## 2023-10-09 ENCOUNTER — TELEPHONE (OUTPATIENT)
Dept: GASTROENTEROLOGY | Facility: CLINIC | Age: 63
End: 2023-10-09

## 2023-10-16 ENCOUNTER — ANESTHESIA EVENT (OUTPATIENT)
Dept: GASTROENTEROLOGY | Facility: AMBULARY SURGERY CENTER | Age: 63
End: 2023-10-16

## 2023-10-16 ENCOUNTER — HOSPITAL ENCOUNTER (OUTPATIENT)
Dept: GASTROENTEROLOGY | Facility: AMBULARY SURGERY CENTER | Age: 63
Setting detail: OUTPATIENT SURGERY
Discharge: HOME | End: 2023-10-16
Attending: INTERNAL MEDICINE
Payer: COMMERCIAL

## 2023-10-16 ENCOUNTER — ANESTHESIA (OUTPATIENT)
Dept: GASTROENTEROLOGY | Facility: AMBULARY SURGERY CENTER | Age: 63
End: 2023-10-16

## 2023-10-16 VITALS
WEIGHT: 170 LBS | DIASTOLIC BLOOD PRESSURE: 62 MMHG | SYSTOLIC BLOOD PRESSURE: 98 MMHG | HEART RATE: 66 BPM | BODY MASS INDEX: 36.68 KG/M2 | OXYGEN SATURATION: 96 % | HEIGHT: 57 IN | TEMPERATURE: 97 F | RESPIRATION RATE: 20 BRPM

## 2023-10-16 DIAGNOSIS — K63.5 POLYP OF COLON, UNSPECIFIED PART OF COLON, UNSPECIFIED TYPE: ICD-10-CM

## 2023-10-16 PROCEDURE — 88305 TISSUE EXAM BY PATHOLOGIST: CPT | Performed by: STUDENT IN AN ORGANIZED HEALTH CARE EDUCATION/TRAINING PROGRAM

## 2023-10-16 RX ORDER — PROPOFOL 10 MG/ML
INJECTION, EMULSION INTRAVENOUS AS NEEDED
Status: DISCONTINUED | OUTPATIENT
Start: 2023-10-16 | End: 2023-10-16

## 2023-10-16 RX ORDER — SODIUM CHLORIDE, SODIUM LACTATE, POTASSIUM CHLORIDE, CALCIUM CHLORIDE 600; 310; 30; 20 MG/100ML; MG/100ML; MG/100ML; MG/100ML
INJECTION, SOLUTION INTRAVENOUS CONTINUOUS PRN
Status: DISCONTINUED | OUTPATIENT
Start: 2023-10-16 | End: 2023-10-16

## 2023-10-16 RX ADMIN — SODIUM CHLORIDE, SODIUM LACTATE, POTASSIUM CHLORIDE, AND CALCIUM CHLORIDE: .6; .31; .03; .02 INJECTION, SOLUTION INTRAVENOUS at 11:21

## 2023-10-16 RX ADMIN — PROPOFOL 10 MG: 10 INJECTION, EMULSION INTRAVENOUS at 11:15

## 2023-10-16 RX ADMIN — PROPOFOL 50 MG: 10 INJECTION, EMULSION INTRAVENOUS at 11:07

## 2023-10-16 RX ADMIN — PROPOFOL 50 MG: 10 INJECTION, EMULSION INTRAVENOUS at 11:10

## 2023-10-16 RX ADMIN — PROPOFOL 100 MG: 10 INJECTION, EMULSION INTRAVENOUS at 11:04

## 2023-10-16 RX ADMIN — SODIUM CHLORIDE, SODIUM LACTATE, POTASSIUM CHLORIDE, AND CALCIUM CHLORIDE: .6; .31; .03; .02 INJECTION, SOLUTION INTRAVENOUS at 10:45

## 2023-10-16 NOTE — H&P
History and Physical -  [facility]  Darius Medina 58 y.o. female MRN: [id number]                  HPI: Darius Medina is a 58y.o. year old female who presents for colon polyps. REVIEW OF SYSTEMS: Per the HPI, and otherwise unremarkable. Historical Information   Past Medical History:   Diagnosis Date    Anemia     Hypertension      Past Surgical History:   Procedure Laterality Date    APPENDECTOMY  10/16/1987    CHOLECYSTECTOMY  10/16/1986     Social History   Social History     Substance and Sexual Activity   Alcohol Use Yes    Comment: socially     Social History     Substance and Sexual Activity   Drug Use Never     Social History     Tobacco Use   Smoking Status Never   Smokeless Tobacco Never     Family History   Problem Relation Age of Onset    No Known Problems Mother     No Known Problems Father     No Known Problems Daughter     No Known Problems Maternal Grandmother     No Known Problems Maternal Grandfather     No Known Problems Paternal Grandmother     No Known Problems Paternal Grandfather     No Known Problems Half-Sister     No Known Problems Maternal Aunt     No Known Problems Maternal Aunt        Meds/Allergies       Current Outpatient Medications:     CALCIUM CITRATE PO    candesartan-hydrochlorothiazide (ATACAND HCT) 16-12.5 MG per tablet    fluticasone (FLONASE) 50 mcg/act nasal spray    montelukast (SINGULAIR) 10 mg tablet    omega-3-acid ethyl esters (LOVAZA) 1 g capsule    travoprost (TRAVATAN-Z) 0.004 % ophthalmic solution    albuterol (PROVENTIL HFA,VENTOLIN HFA) 90 mcg/act inhaler    Clenpiq oral solution  No current facility-administered medications for this encounter.     Facility-Administered Medications Ordered in Other Encounters:     lactated ringers infusion, , Intravenous, Continuous PRN, New Bag at 10/16/23 1045    Allergies   Allergen Reactions    Sulfa Antibiotics Blisters       Objective     /60   Pulse 61   Temp 97.6 °F (36.4 °C) (Temporal) Resp 20   Ht 4' 9" (1.448 m)   Wt 77.1 kg (170 lb)   SpO2 96%   BMI 36.79 kg/m²       PHYSICAL EXAM    Gen: NAD  Head: NCAT  CV: RRR  CHEST: Clear  ABD: soft, NT/ND  EXT: no edema      ASSESSMENT/PLAN:  This is a 58y.o. year old female here for colonoscopy, and she is stable and optimized for her procedure.

## 2023-10-16 NOTE — ANESTHESIA PREPROCEDURE EVALUATION
Procedure:  COLONOSCOPY    Relevant Problems   CARDIO   (+) Hyperlipidemia   (+) Hypertension      PULMONARY   (+) Mild intermittent asthma without complication   (+) TOM (obstructive sleep apnea)        Physical Exam    Airway    Mallampati score: II  TM Distance: >3 FB  Neck ROM: full     Dental       Cardiovascular      Pulmonary      Other Findings        Anesthesia Plan  ASA Score- 2     Anesthesia Type- IV sedation with anesthesia with ASA Monitors. Additional Monitors:     Airway Plan:     Comment:  used. Plan Factors-Exercise tolerance (METS): >4 METS. Chart reviewed. Patient is not a current smoker. Patient did not smoke on day of surgery. Obstructive sleep apnea risk education given perioperatively. Induction- intravenous. Postoperative Plan-     Informed Consent- Anesthetic plan and risks discussed with patient. I personally reviewed this patient with the CRNA. Discussed and agreed on the Anesthesia Plan with the CRNA. .          NPO appropriate. Discussed benefits/risks of monitored anesthetic care and discussed providing a dynamic level of mild to deep sedation. Risks include awareness, airway obstruction, aspiration which may necessitate conversion to general anesthesia. All questions answered. Patient understands and wishes to proceed. Anesthesia plan and consent discussed with Darin  who expressed understanding and agreement. Risks/benefits and alternatives discussed with patient including possible PONV, sore throat, damage to teeth/lips/gums and possibility of rare anesthetic and surgical emergencies.

## 2023-10-16 NOTE — ANESTHESIA POSTPROCEDURE EVALUATION
Post-Op Assessment Note    CV Status:  Stable  Pain Score: 0    Pain management: adequate     Mental Status:  Alert and awake   Hydration Status:  Euvolemic   PONV Controlled:  Controlled   Airway Patency:  Patent      Post Op Vitals Reviewed: Yes      Staff: CRNA         No notable events documented.     BP   130/69   Temp   98   Pulse  68   Resp   16   SpO2   99

## 2023-10-17 PROCEDURE — 88305 TISSUE EXAM BY PATHOLOGIST: CPT | Performed by: STUDENT IN AN ORGANIZED HEALTH CARE EDUCATION/TRAINING PROGRAM

## 2023-10-19 ENCOUNTER — TELEPHONE (OUTPATIENT)
Age: 63
End: 2023-10-19

## 2023-11-02 DIAGNOSIS — J30.2 SEASONAL ALLERGIC RHINITIS, UNSPECIFIED TRIGGER: ICD-10-CM

## 2023-11-07 RX ORDER — FLUTICASONE PROPIONATE 50 MCG
SPRAY, SUSPENSION (ML) NASAL
Qty: 48 ML | Refills: 1 | Status: SHIPPED | OUTPATIENT
Start: 2023-11-07

## 2023-12-15 ENCOUNTER — APPOINTMENT (EMERGENCY)
Dept: RADIOLOGY | Facility: HOSPITAL | Age: 63
End: 2023-12-15
Payer: COMMERCIAL

## 2023-12-15 ENCOUNTER — HOSPITAL ENCOUNTER (EMERGENCY)
Facility: HOSPITAL | Age: 63
Discharge: HOME | End: 2023-12-15
Attending: EMERGENCY MEDICINE
Payer: COMMERCIAL

## 2023-12-15 VITALS
OXYGEN SATURATION: 98 % | RESPIRATION RATE: 18 BRPM | SYSTOLIC BLOOD PRESSURE: 130 MMHG | DIASTOLIC BLOOD PRESSURE: 62 MMHG | HEART RATE: 76 BPM | TEMPERATURE: 98.2 F

## 2023-12-15 DIAGNOSIS — J20.9 ACUTE BRONCHITIS, UNSPECIFIED ORGANISM: Primary | ICD-10-CM

## 2023-12-15 LAB
ALBUMIN SERPL BCP-MCNC: 4.5 G/DL (ref 3.5–5)
ALP SERPL-CCNC: 53 U/L (ref 34–104)
ALT SERPL W P-5'-P-CCNC: 32 U/L (ref 7–52)
ANION GAP SERPL CALCULATED.3IONS-SCNC: 9 MMOL/L
AST SERPL W P-5'-P-CCNC: 23 U/L (ref 13–39)
BASOPHILS # BLD AUTO: 0.03 THOUSANDS/ÂΜL (ref 0–0.1)
BASOPHILS NFR BLD AUTO: 0 % (ref 0–1)
BILIRUB SERPL-MCNC: 0.41 MG/DL (ref 0.2–1)
BUN SERPL-MCNC: 17 MG/DL (ref 5–25)
CALCIUM SERPL-MCNC: 9.6 MG/DL (ref 8.4–10.2)
CARDIAC TROPONIN I PNL SERPL HS: 2 NG/L
CHLORIDE SERPL-SCNC: 103 MMOL/L (ref 96–108)
CO2 SERPL-SCNC: 27 MMOL/L (ref 21–32)
CREAT SERPL-MCNC: 0.72 MG/DL (ref 0.6–1.3)
EOSINOPHIL # BLD AUTO: 0.23 THOUSAND/ÂΜL (ref 0–0.61)
EOSINOPHIL NFR BLD AUTO: 3 % (ref 0–6)
ERYTHROCYTE [DISTWIDTH] IN BLOOD BY AUTOMATED COUNT: 13.3 % (ref 11.6–15.1)
FLUAV RNA RESP QL NAA+PROBE: NEGATIVE
FLUBV RNA RESP QL NAA+PROBE: NEGATIVE
GFR SERPL CREATININE-BSD FRML MDRD: 88 ML/MIN/1.73SQ M
GLUCOSE SERPL-MCNC: 87 MG/DL (ref 65–140)
HCT VFR BLD AUTO: 44.7 % (ref 34.8–46.1)
HGB BLD-MCNC: 14.6 G/DL (ref 11.5–15.4)
IMM GRANULOCYTES # BLD AUTO: 0.03 THOUSAND/UL (ref 0–0.2)
IMM GRANULOCYTES NFR BLD AUTO: 0 % (ref 0–2)
LYMPHOCYTES # BLD AUTO: 3.42 THOUSANDS/ÂΜL (ref 0.6–4.47)
LYMPHOCYTES NFR BLD AUTO: 41 % (ref 14–44)
MCH RBC QN AUTO: 29.8 PG (ref 26.8–34.3)
MCHC RBC AUTO-ENTMCNC: 32.7 G/DL (ref 31.4–37.4)
MCV RBC AUTO: 91 FL (ref 82–98)
MONOCYTES # BLD AUTO: 0.53 THOUSAND/ÂΜL (ref 0.17–1.22)
MONOCYTES NFR BLD AUTO: 6 % (ref 4–12)
NEUTROPHILS # BLD AUTO: 4.05 THOUSANDS/ÂΜL (ref 1.85–7.62)
NEUTS SEG NFR BLD AUTO: 50 % (ref 43–75)
NRBC BLD AUTO-RTO: 0 /100 WBCS
PLATELET # BLD AUTO: 238 THOUSANDS/UL (ref 149–390)
PMV BLD AUTO: 9.4 FL (ref 8.9–12.7)
POTASSIUM SERPL-SCNC: 3.5 MMOL/L (ref 3.5–5.3)
PROT SERPL-MCNC: 7.6 G/DL (ref 6.4–8.4)
RBC # BLD AUTO: 4.9 MILLION/UL (ref 3.81–5.12)
RSV RNA RESP QL NAA+PROBE: NEGATIVE
SARS-COV-2 RNA RESP QL NAA+PROBE: NEGATIVE
SODIUM SERPL-SCNC: 139 MMOL/L (ref 135–147)
WBC # BLD AUTO: 8.29 THOUSAND/UL (ref 4.31–10.16)

## 2023-12-15 PROCEDURE — 93005 ELECTROCARDIOGRAM TRACING: CPT

## 2023-12-15 PROCEDURE — 85025 COMPLETE CBC W/AUTO DIFF WBC: CPT | Performed by: EMERGENCY MEDICINE

## 2023-12-15 PROCEDURE — 71046 X-RAY EXAM CHEST 2 VIEWS: CPT

## 2023-12-15 PROCEDURE — 84484 ASSAY OF TROPONIN QUANT: CPT | Performed by: EMERGENCY MEDICINE

## 2023-12-15 PROCEDURE — 0241U HB NFCT DS VIR RESP RNA 4 TRGT: CPT | Performed by: EMERGENCY MEDICINE

## 2023-12-15 PROCEDURE — 36415 COLL VENOUS BLD VENIPUNCTURE: CPT | Performed by: EMERGENCY MEDICINE

## 2023-12-15 PROCEDURE — 94640 AIRWAY INHALATION TREATMENT: CPT

## 2023-12-15 PROCEDURE — 80053 COMPREHEN METABOLIC PANEL: CPT | Performed by: EMERGENCY MEDICINE

## 2023-12-15 PROCEDURE — 99285 EMERGENCY DEPT VISIT HI MDM: CPT | Performed by: EMERGENCY MEDICINE

## 2023-12-15 PROCEDURE — 99284 EMERGENCY DEPT VISIT MOD MDM: CPT

## 2023-12-15 RX ORDER — ALBUTEROL SULFATE 90 UG/1
2 AEROSOL, METERED RESPIRATORY (INHALATION) ONCE
Status: COMPLETED | OUTPATIENT
Start: 2023-12-15 | End: 2023-12-15

## 2023-12-15 RX ORDER — IPRATROPIUM BROMIDE AND ALBUTEROL SULFATE 2.5; .5 MG/3ML; MG/3ML
3 SOLUTION RESPIRATORY (INHALATION) ONCE
Status: COMPLETED | OUTPATIENT
Start: 2023-12-15 | End: 2023-12-15

## 2023-12-15 RX ADMIN — IPRATROPIUM BROMIDE AND ALBUTEROL SULFATE 3 ML: 2.5; .5 SOLUTION RESPIRATORY (INHALATION) at 17:44

## 2023-12-15 RX ADMIN — ALBUTEROL SULFATE 2 PUFF: 90 AEROSOL, METERED RESPIRATORY (INHALATION) at 18:31

## 2023-12-15 NOTE — ED PROVIDER NOTES
History  Chief Complaint   Patient presents with    Cough     Pt [facility] was given antibiotic and inhaler but told to come to ER for Eval of Pneumonia. Pt is scheduled to fly to [facility] tomorrow and would like clearance to fly also. 60-year-old female with a history of hypertension and asthma presenting for evaluation of cough. Patient states her symptoms started yesterday but acutely worsened today. Notes a nonproductive cough with mild shortness of breath. States it feels similar to her asthma. Has not been using any inhalers at home. Patient also notes a midsternal chest pressure sensation that is worse with coughing but is now also present at rest.  Notes some intermittent back pain is worse with deep breathing, as well. Has some chills but denies fever, rhinorrhea, sore throat, nausea, vomiting, abdominal pain. Reports her testing for influenza and COVID-19 was negative at urgent care. Patient was sent here for possible pneumonia; she was given doxycycline at urgent care. Cough  Associated symptoms: chills    Associated symptoms: no chest pain, no fever, no myalgias, no rash, no rhinorrhea, no shortness of breath and no sore throat        Prior to Admission Medications   Prescriptions Last Dose Informant Patient Reported? Taking?    CALCIUM CITRATE PO  Self Yes No   Sig: Take by mouth   albuterol (PROVENTIL HFA,VENTOLIN HFA) 90 mcg/act inhaler  Self No No   Sig: Inhale 1 puff every 6 (six) hours as needed for wheezing or shortness of breath   candesartan-hydrochlorothiazide (ATACAND HCT) 16-12.5 MG per tablet   No No   Sig: Take 0.5 tablets by mouth daily   fluticasone (FLONASE) 50 mcg/act nasal spray   No No   Sig: SPRAY 1 SPRAY INTO EACH NOSTRIL EVERY DAY   montelukast (SINGULAIR) 10 mg tablet   No No   Sig: Take 1 tablet (10 mg total) by mouth daily at bedtime   omega-3-acid ethyl esters (LOVAZA) 1 g capsule   Yes No   Sig: Take 2 g by mouth in the morning   travoprost (TRAVATAN-Z) 0.004 % ophthalmic solution  Self Yes No   Sig: INSTILL 1 DROP INTO BOTH EYES IN THE EVENING      Facility-Administered Medications: None       Past Medical History:   Diagnosis Date    Anemia     Hypertension        Past Surgical History:   Procedure Laterality Date    APPENDECTOMY  10/16/1987    CHOLECYSTECTOMY  10/16/1986       Family History   Problem Relation Age of Onset    No Known Problems Mother     No Known Problems Father     No Known Problems Daughter     No Known Problems Maternal Grandmother     No Known Problems Maternal Grandfather     No Known Problems Paternal Grandmother     No Known Problems Paternal Grandfather     No Known Problems Half-Sister     No Known Problems Maternal Aunt     No Known Problems Maternal Aunt      I have reviewed and agree with the history as documented. E-Cigarette/Vaping    E-Cigarette Use Never User      E-Cigarette/Vaping Substances    Nicotine No     THC No     CBD No     Flavoring No     Other No     Unknown No      Social History     Tobacco Use    Smoking status: Never    Smokeless tobacco: Never   Vaping Use    Vaping status: Never Used   Substance Use Topics    Alcohol use: Yes     Comment: socially    Drug use: Never       Review of Systems   Constitutional:  Positive for chills. Negative for fever. HENT:  Negative for congestion, rhinorrhea and sore throat. Respiratory:  Positive for cough. Negative for shortness of breath. Cardiovascular:  Negative for chest pain, palpitations and leg swelling. Gastrointestinal:  Negative for abdominal pain, diarrhea, nausea and vomiting. Musculoskeletal:  Negative for arthralgias and myalgias. Skin:  Negative for color change and rash. Neurological:  Negative for weakness. Hematological:  Does not bruise/bleed easily. Physical Exam  Physical Exam  Vitals and nursing note reviewed. Constitutional:       General: She is not in acute distress.      Appearance: She is not ill-appearing, toxic-appearing or diaphoretic. HENT:      Head: Normocephalic and atraumatic. Nose: Nose normal.      Mouth/Throat:      Mouth: Mucous membranes are moist.   Eyes:      General: No scleral icterus. Right eye: No discharge. Left eye: No discharge. Conjunctiva/sclera: Conjunctivae normal.   Cardiovascular:      Rate and Rhythm: Normal rate and regular rhythm. Heart sounds: No murmur heard. Pulmonary:      Effort: Pulmonary effort is normal. No respiratory distress. Breath sounds: Normal breath sounds. No wheezing, rhonchi or rales. Comments: Bronchospastic cough present  Abdominal:      Palpations: Abdomen is soft. Tenderness: There is no abdominal tenderness. There is no guarding or rebound. Musculoskeletal:         General: No swelling or deformity. Cervical back: Neck supple. Right lower leg: No edema. Left lower leg: No edema. Skin:     General: Skin is warm and dry. Findings: No rash. Neurological:      General: No focal deficit present. Mental Status: She is alert and oriented to person, place, and time.    Psychiatric:         Mood and Affect: Mood normal.         Behavior: Behavior normal.         Vital Signs  ED Triage Vitals [12/15/23 1412]   Temperature Pulse Respirations Blood Pressure SpO2   98.2 °F (36.8 °C) 76 18 130/62 98 %      Temp Source Heart Rate Source Patient Position - Orthostatic VS BP Location FiO2 (%)   Oral Monitor Sitting Right arm --      Pain Score       --           Vitals:    12/15/23 1412   BP: 130/62   Pulse: 76   Patient Position - Orthostatic VS: Sitting         Visual Acuity      ED Medications  Medications   ipratropium-albuterol (DUO-NEB) 0.5-2.5 mg/3 mL inhalation solution 3 mL (3 mL Nebulization Given 12/15/23 7061)   albuterol (PROVENTIL HFA,VENTOLIN HFA) inhaler 2 puff (2 puffs Inhalation Given 12/15/23 1831)       Diagnostic Studies  Results Reviewed       Procedure Component Value Units Date/Time    FLU/RSV/COVID - if FLU/RSV clinically relevant [[id number]]  (Normal) Collected: 12/15/23 1736    Lab Status: Final result Specimen: Nares from Nose Updated: 12/15/23 1825     SARS-CoV-2 Negative     INFLUENZA A PCR Negative     INFLUENZA B PCR Negative     RSV PCR Negative    Narrative:      FOR PEDIATRIC PATIENTS - copy/paste COVID Guidelines URL to browser: [email or web address]    SARS-CoV-2 assay is a Nucleic Acid Amplification assay intended for the  qualitative detection of nucleic acid from SARS-CoV-2 in nasopharyngeal  swabs. Results are for the presumptive identification of SARS-CoV-2 RNA. Positive results are indicative of infection with SARS-CoV-2, the virus  causing COVID-19, but do not rule out bacterial infection or co-infection  with other viruses. Laboratories within the [facility] and its  territories are required to report all positive results to the appropriate  public health authorities. Negative results do not preclude SARS-CoV-2  infection and should not be used as the sole basis for treatment or other  patient management decisions. Negative results must be combined with  clinical observations, patient history, and epidemiological information. This test has not been FDA cleared or approved. This test has been authorized by FDA under an Emergency Use Authorization  (EUA). This test is only authorized for the duration of time the  declaration that circumstances exist justifying the authorization of the  emergency use of an in vitro diagnostic tests for detection of SARS-CoV-2  virus and/or diagnosis of COVID-19 infection under section 564(b)(1) of  the Act, 21 U. S.C. 579FZF-7(M)(3), unless the authorization is terminated  or revoked sooner. The test has been validated but independent review by FDA  and CLIA is pending. Test performed using [employer]: This RT-PCR assay targets N2,  a region unique to SARS-CoV-2. A conserved region in the E-gene was chosen  for pan-Sarbecovirus detection which includes SARS-CoV-2. According to CMS-2020-01-R, this platform meets the definition of high-throughput technology.     HS Troponin 0hr (reflex protocol) [[id number]]  (Normal) Collected: 12/15/23 1736    Lab Status: Final result Specimen: Blood from Arm, Left Updated: 12/15/23 1811     hs TnI 0hr 2 ng/L     Comprehensive metabolic panel [[id number]] Collected: 12/15/23 1736    Lab Status: Final result Specimen: Blood from Arm, Left Updated: 12/15/23 1804     Sodium 139 mmol/L      Potassium 3.5 mmol/L      Chloride 103 mmol/L      CO2 27 mmol/L      ANION GAP 9 mmol/L      BUN 17 mg/dL      Creatinine 0.72 mg/dL      Glucose 87 mg/dL      Calcium 9.6 mg/dL      AST 23 U/L      ALT 32 U/L      Alkaline Phosphatase 53 U/L      Total Protein 7.6 g/dL      Albumin 4.5 g/dL      Total Bilirubin 0.41 mg/dL      eGFR 88 ml/min/1.73sq m     Narrative:      National Kidney Disease Foundation guidelines for Chronic Kidney Disease (CKD):     Stage 1 with normal or high GFR (GFR > 90 mL/min/1.73 square meters)    Stage 2 Mild CKD (GFR = 60-89 mL/min/1.73 square meters)    Stage 3A Moderate CKD (GFR = 45-59 mL/min/1.73 square meters)    Stage 3B Moderate CKD (GFR = 30-44 mL/min/1.73 square meters)    Stage 4 Severe CKD (GFR = 15-29 mL/min/1.73 square meters)    Stage 5 End Stage CKD (GFR <15 mL/min/1.73 square meters)  Note: GFR calculation is accurate only with a steady state creatinine    CBC and differential [[id number]] Collected: 12/15/23 1736    Lab Status: Final result Specimen: Blood from Arm, Left Updated: 12/15/23 1743     WBC 8.29 Thousand/uL      RBC 4.90 Million/uL      Hemoglobin 14.6 g/dL      Hematocrit 44.7 %      MCV 91 fL      MCH 29.8 pg      MCHC 32.7 g/dL      RDW 13.3 %      MPV 9.4 fL      Platelets 788 Thousands/uL      nRBC 0 /100 WBCs      Neutrophils Relative 50 %      Immat GRANS % 0 %      Lymphocytes Relative 41 % Monocytes Relative 6 %      Eosinophils Relative 3 %      Basophils Relative 0 %      Neutrophils Absolute 4.05 Thousands/µL      Immature Grans Absolute 0.03 Thousand/uL      Lymphocytes Absolute 3.42 Thousands/µL      Monocytes Absolute 0.53 Thousand/µL      Eosinophils Absolute 0.23 Thousand/µL      Basophils Absolute 0.03 Thousands/µL                    XR chest 2 views   ED Interpretation by Nori Wiley MD (12/15 1822)   No acute cardiopulmonary abnormality                 Procedures  ECG 12 Lead Documentation Only    Date/Time: 12/15/2023 5:31 PM    Performed by: Nori Wiley MD  Authorized by: Nori Wiley MD    Indications / Diagnosis:  Chest pain, eval for ischemia  ECG reviewed by me, the ED Provider: yes    Patient location:  ED  Previous ECG:     Previous ECG:  Unavailable  Interpretation:     Interpretation: normal    Rate:     ECG rate:  82    ECG rate assessment: normal    Rhythm:     Rhythm: sinus rhythm    Ectopy:     Ectopy: none    QRS:     QRS axis:  Normal    QRS intervals:  Normal  Conduction:     Conduction: normal    ST segments:     ST segments:  Normal  T waves:     T waves: normal             ED Course  ED Course as of 12/15/23 1836   Fri Dec 15, 2023   1804 CBC and differential  Grossly normal   1804 Comprehensive metabolic panel  Grossly normal   1811 hs TnI 0hr: 2   1821 Patient re-evaluated; cough improved after duoneb.  Will give albuterol inhaler; counseled on its continued use at home   1825 SARS-COV-2: Negative   1825 INFLU A PCR: Negative   1825 INFLU B PCR: Negative   1825 RSV PCR: Negative             HEART Risk Score      Flowsheet Row Most Recent Value   Heart Score Risk Calculator    History 0 Filed at: 12/15/2023 1811   ECG 0 Filed at: 12/15/2023 1811   Age 1 Filed at: 12/15/2023 1811   Risk Factors 1 Filed at: 12/15/2023 1811   Troponin 0 Filed at: 12/15/2023 1811   HEART Score 2 Filed at: 12/15/2023 1811 Medical Decision Making  69-year-old female presenting for evaluation of cough and chest pain. Differential diagnosis includes acute coronary syndrome, malignant dysrhythmia, pneumonia, pneumothorax, pulmonary edema, pleural effusion, bronchitis, viral upper respiratory infection, including COVID-19, influenza, RSV, other viral process. EKG shows normal sinus rhythm without evidence of ischemia or malignant dysrhythmia. Initial troponin is 2; chest pain has been greater than 3 hours. Heart score is 2; patient is at low risk for major acute cardiac event the next 3 days. She is appropriate for outpatient PCP follow-up. Low suspicion for ACS at this time. Chest x-ray per my interpretation is negative for acute cardiopulmonary abnormality. Laboratory studies are otherwise grossly normal.  Suspect acute bronchitis, possibly secondary to viral etiology, as a cause of the patient's symptoms. No evidence of wheezing on exam to suggest acute asthma exacerbation. Patient given DuoNeb treatment here with improvement in her cough, and she will continue albuterol scheduled at home two puffs every 2-4 hours for the next day and then as needed. Instructed she does not need antibiotics at this time. Will follow-up with PCP. Return precautions discussed. Patient is in agreement and understanding of these instructions. Amount and/or Complexity of Data Reviewed  Labs: ordered. Decision-making details documented in ED Course. Radiology: independent interpretation performed. Risk  Prescription drug management.              Disposition  Final diagnoses:   Acute bronchitis, unspecified organism     Time reflects when diagnosis was documented in both MDM as applicable and the Disposition within this note       Time User Action Codes Description Comment    12/15/2023  6:23 PM Adrianne Melara Add [J06.9] Viral URI with cough     12/15/2023  6:23 PM Adrianne Melara Remove [J06.9] Viral URI with cough     12/15/2023  6:23 PM Adrianne Melara Add [J20.9] Acute bronchitis, unspecified organism           ED Disposition       ED Disposition   Discharge    Condition   Stable    Date/Time   Fri Dec 15, 2023 1823    [address] discharge to home/self care. Follow-up Information       Follow up With Specialties Details Why Contact Info Additional Information    [facility] Internal Medicine [facility]) Internal Medicine  As needed [address] [facility] [address]  1 [facility] Internal Medicine [facility], [address] [place], [place], [address] [phone]            Current Discharge Medication List        CONTINUE these medications which have NOT CHANGED    Details   albuterol (PROVENTIL HFA,VENTOLIN HFA) 90 mcg/act inhaler Inhale 1 puff every 6 (six) hours as needed for wheezing or shortness of breath  Qty: 18 g, Refills: 2    Comments: Substitution to a formulary equivalent within the same pharmaceutical class is authorized. Associated Diagnoses: Uncomplicated asthma, unspecified asthma severity, unspecified whether persistent      CALCIUM CITRATE PO Take by mouth      candesartan-hydrochlorothiazide (ATACAND HCT) 16-12.5 MG per tablet Take 0.5 tablets by mouth daily  Qty: 45 tablet, Refills: 1    Comments: DX Code Needed  PATIENT REQUEST. Associated Diagnoses: Hyperlipidemia, unspecified hyperlipidemia type; Essential hypertension; Severe obesity (BMI 35.0-39. 9) with comorbidity (HCC)      fluticasone (FLONASE) 50 mcg/act nasal spray SPRAY 1 SPRAY INTO EACH NOSTRIL EVERY DAY  Qty: 48 mL, Refills: 1    Associated Diagnoses: Seasonal allergic rhinitis, unspecified trigger      montelukast (SINGULAIR) 10 mg tablet Take 1 tablet (10 mg total) by mouth daily at bedtime  Qty: 90 tablet, Refills: 1    Comments: DX Code Needed  PATIENT REQUEST.   Associated Diagnoses: Uncomplicated asthma, unspecified asthma severity, unspecified whether persistent      omega-3-acid ethyl esters (LOVAZA) 1 g capsule Take 2 g by mouth in the morning      travoprost (TRAVATAN-Z) 0.004 % ophthalmic solution INSTILL 1 DROP INTO BOTH EYES IN THE EVENING             No discharge procedures on file.     PDMP Review       None            ED Provider  Electronically Signed by             Ryan Espino MD  12/15/23 3465

## 2023-12-17 DIAGNOSIS — J45.909 UNCOMPLICATED ASTHMA, UNSPECIFIED ASTHMA SEVERITY, UNSPECIFIED WHETHER PERSISTENT: ICD-10-CM

## 2023-12-17 DIAGNOSIS — E78.5 HYPERLIPIDEMIA, UNSPECIFIED HYPERLIPIDEMIA TYPE: ICD-10-CM

## 2023-12-17 DIAGNOSIS — E66.01 SEVERE OBESITY (BMI 35.0-39.9) WITH COMORBIDITY (HCC): ICD-10-CM

## 2023-12-17 DIAGNOSIS — I10 ESSENTIAL HYPERTENSION: ICD-10-CM

## 2023-12-17 LAB
ATRIAL RATE: 82 BPM
P AXIS: 65 DEGREES
PR INTERVAL: 156 MS
QRS AXIS: 65 DEGREES
QRSD INTERVAL: 78 MS
QT INTERVAL: 406 MS
QTC INTERVAL: 474 MS
T WAVE AXIS: 53 DEGREES
VENTRICULAR RATE: 82 BPM

## 2023-12-22 RX ORDER — CANDESARTAN CILEXETIL AND HYDROCHLOROTHIAZIDE 16; 12.5 MG/1; MG/1
0.5 TABLET ORAL DAILY
Qty: 45 TABLET | Refills: 1 | Status: SHIPPED | OUTPATIENT
Start: 2023-12-22

## 2023-12-22 RX ORDER — MONTELUKAST SODIUM 10 MG/1
10 TABLET ORAL
Qty: 90 TABLET | Refills: 1 | Status: SHIPPED | OUTPATIENT
Start: 2023-12-22

## 2024-01-11 ENCOUNTER — RA CDI HCC (OUTPATIENT)
Dept: OTHER | Facility: HOSPITAL | Age: 64
End: 2024-01-11

## 2024-01-11 NOTE — PROGRESS NOTES
Mild intermittent asthma without complication  [J45.20]        NOT on the BPA- please assess using MEAT for 2024 billing    HCC coding opportunities          Chart Reviewed number of suggestions sent to Provider: 2     Patients Insurance        Commercial Insurance: [employer] Commercial [employer]

## 2024-01-12 ENCOUNTER — TELEPHONE (OUTPATIENT)
Dept: FAMILY MEDICINE CLINIC | Facility: CLINIC | Age: 64
End: 2024-01-12

## 2024-02-12 PROBLEM — Z00.00 ANNUAL PHYSICAL EXAM: Status: RESOLVED | Noted: 2022-04-05 | Resolved: 2024-02-12

## 2024-02-20 ENCOUNTER — OFFICE VISIT (OUTPATIENT)
Dept: FAMILY MEDICINE CLINIC | Facility: CLINIC | Age: 64
End: 2024-02-20

## 2024-02-20 VITALS
OXYGEN SATURATION: 97 % | RESPIRATION RATE: 18 BRPM | BODY MASS INDEX: 37.24 KG/M2 | HEART RATE: 94 BPM | TEMPERATURE: 98.3 F | HEIGHT: 57 IN | SYSTOLIC BLOOD PRESSURE: 117 MMHG | DIASTOLIC BLOOD PRESSURE: 72 MMHG | WEIGHT: 172.6 LBS

## 2024-02-20 DIAGNOSIS — J45.20 MILD INTERMITTENT ASTHMA WITHOUT COMPLICATION: ICD-10-CM

## 2024-02-20 DIAGNOSIS — E78.5 HYPERLIPIDEMIA, UNSPECIFIED HYPERLIPIDEMIA TYPE: ICD-10-CM

## 2024-02-20 DIAGNOSIS — I10 PRIMARY HYPERTENSION: ICD-10-CM

## 2024-02-20 DIAGNOSIS — R73.03 PREDIABETES: ICD-10-CM

## 2024-02-20 DIAGNOSIS — E66.9 CLASS 2 OBESITY: Primary | ICD-10-CM

## 2024-02-20 DIAGNOSIS — G47.33 OSA (OBSTRUCTIVE SLEEP APNEA): ICD-10-CM

## 2024-02-20 DIAGNOSIS — J30.2 SEASONAL ALLERGIC RHINITIS, UNSPECIFIED TRIGGER: ICD-10-CM

## 2024-02-20 PROCEDURE — 99213 OFFICE O/P EST LOW 20 MIN: CPT | Performed by: FAMILY MEDICINE

## 2024-02-20 RX ORDER — FLUTICASONE PROPIONATE 50 MCG
1 SPRAY, SUSPENSION (ML) NASAL DAILY
Qty: 48 ML | Refills: 1 | Status: SHIPPED | OUTPATIENT
Start: 2024-02-20

## 2024-02-20 NOTE — ASSESSMENT & PLAN NOTE
BMI Counseling: Body mass index is 37.35 kg/m². The BMI is above normal. Nutrition recommendations include reducing portion sizes, decreasing overall calorie intake, 3-5 servings of fruits/vegetables daily and reducing fast food intake. Exercise recommendations include moderate aerobic physical activity for 150 minutes/week, exercising 3-5 times per week and strength training exercises.   If not losing weight adequately with lifestyle changes, consider weight loss medication. Short term goal is losing 4 lbs in 4 weeks. F/u in 1 mo

## 2024-02-20 NOTE — PATIENT INSTRUCTIONS
Central Scheduling for Lung Function Test: [phone]  Get blood work done before next appointment (non-fasting, may eat)

## 2024-02-20 NOTE — PROGRESS NOTES
Name: Kalie Franco      : 1960      MRN: [id number]  Encounter Provider: Sinai Burt DO  Encounter Date: 2024   [facility]    Assessment & Plan     1. Class 2 obesity  Assessment & Plan:  BMI Counseling: Body mass index is 37.35 kg/m². The BMI is above normal. Nutrition recommendations include reducing portion sizes, decreasing overall calorie intake, 3-5 servings of fruits/vegetables daily and reducing fast food intake. Exercise recommendations include moderate aerobic physical activity for 150 minutes/week, exercising 3-5 times per week and strength training exercises.   If not losing weight adequately with lifestyle changes, consider weight loss medication. Short term goal is losing 4 lbs in 4 weeks. F/u in 1 mo      2. Seasonal allergic rhinitis, unspecified trigger    3. Hyperlipidemia, unspecified hyperlipidemia type  Assessment & Plan:  Reviewed Lipid Panel.  Discussed lifestyle changes  C/w omega 3    Lipid       Lab Results   Component Value Date    CHOLESTEROL 205 (H) 10/07/2023    TRIG 172 (H) 10/07/2023    HDL 65 10/07/2023    LDLCALC 106 (H) 10/07/2023           4. Prediabetes  Assessment & Plan:  A1c 6.2% 10/2023 increased from 6.1% 10/2022  Discussed lifestyle changes      5. Primary hypertension  Assessment & Plan:  Well controlled with Atacand HCT 16 - 12.5 mg         6. Mild intermittent asthma without complication  Assessment & Plan:  Stable. Uses singulair nightly.   - no PFT's on file, pt reports last done in [place] over 30 years ago    Plan  - Repeat PFTs  - recommend control of allergies with singulair, zyrtec, flonase as needed for congestion.       7. TOM (obstructive sleep apnea)  Assessment & Plan:  Uses CPAP nightly. Awakens refreshed and denies fatigue. Follows with Sleep medicine.   Discussed weight loss with lifestyle modifications.   Continue flonase as needed for congestion          Depression  Screening and Follow-up Plan: Patient was screened for depression during today's encounter. They screened negative with a PHQ-2 score of 0.        Subjective      HPI    Kalie Franco is a 63-year-old female with a history of hypertension, hyperlipidemia, prediabetes, obesity, anemia, and asthma presenting for persistent cough, rhinorrhea in the mornings after a recent visit to the ED on 12/15/23. She is currently using albuterol inhaler PRN since December.   She has no complaints of dizziness, shortness of breath, chest pain.     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     Seasonal allergies:   - Dry cough   - congestion   - sneezing   - triggers: smoke, dust, cold weather, pt believes she has a lot of triggers   - Flonase helps with her allergies, ran out.    - Uses albuterol inhaler for cough about once a week   - Denies shortness of breath       Review of Systems  Review of Systems   Constitutional:  Negative for chills Negative for fever.   HENT:  Negative for congestion, rhinorrhea and sore throat.    Respiratory:  Negative for cough. Negative for shortness of breath.    Cardiovascular:  Negative for chest pain, palpitations and leg swelling.   Gastrointestinal:  Negative for abdominal pain, diarrhea, nausea and vomiting.   Musculoskeletal:  Negative for arthralgias and myalgias.   Skin:  Negative for color change and rash.   Neurological:  Negative for weakness.   Hematological:  Does not bruise/bleed easily.         Current Outpatient Medications on File Prior to Visit   Medication Sig    albuterol (PROVENTIL HFA,VENTOLIN HFA) 90 mcg/act inhaler Inhale 1 puff every 6 (six) hours as needed for wheezing or shortness of breath    CALCIUM CITRATE PO Take by mouth    candesartan-hydrochlorothiazide (ATACAND HCT) 16-12.5 MG per tablet TAKE 1/2 TABLET BY MOUTH DAILY    fluticasone (FLONASE) 50 mcg/act nasal spray SPRAY 1 SPRAY INTO EACH NOSTRIL EVERY DAY     "montelukast (SINGULAIR) 10 mg tablet TAKE 1 TABLET BY MOUTH DAILY AT BEDTIME    omega-3-acid ethyl esters (LOVAZA) 1 g capsule Take 2 g by mouth in the morning    travoprost (TRAVATAN-Z) 0.004 % ophthalmic solution INSTILL 1 DROP INTO BOTH EYES IN THE EVENING       Objective     /72 (BP Location: Right arm, Patient Position: Sitting, Cuff Size: Large)   Pulse 94   Temp 98.3 °F (36.8 °C) (Temporal)   Resp 18   Ht 4' 9\" (1.448 m)   Wt 78.3 kg (172 lb 9.6 oz)   SpO2 97%   BMI 37.35 kg/m²     Physical Exam  Physical Exam  Vitals and nursing note reviewed.   Constitutional:       General: She is not in acute distress.     Appearance: She is not ill-appearing, toxic-appearing or diaphoretic.   HENT:      Head: Normocephalic and atraumatic.      Nose: Nose normal.      Mouth/Throat:      Mouth: Mucous membranes are moist.   Eyes:      General: No scleral icterus.        Right eye: No discharge.         Left eye: No discharge.      Conjunctiva/sclera: Conjunctivae normal.   Cardiovascular:      Rate and Rhythm: Normal rate and regular rhythm.      Heart sounds: No murmur heard.  Pulmonary:      Effort: Pulmonary effort is normal. No respiratory distress.      Breath sounds: Normal breath sounds. No wheezing, rhonchi or rales.   Abdominal:      Palpations: Abdomen is soft.      Tenderness: There is no abdominal tenderness. There is no guarding or rebound.   Musculoskeletal:         General: No swelling or deformity.      Cervical back: Neck supple.      Right lower leg: No edema.      Left lower leg: No edema.   Skin:     General: Skin is warm and dry.      Findings: No rash.   Neurological:      General: No focal deficit present.      Mental Status: She is alert and oriented to person, place, and time.   Psychiatric:         Mood and Affect: Mood normal.         Behavior: Behavior normal.           "

## 2024-02-20 NOTE — ASSESSMENT & PLAN NOTE
Stable. Uses singulair nightly.   - no PFT's on file, pt reports last done in [place] over 30 years ago    Plan  - Repeat PFTs  - recommend control of allergies with singulair, zyrtec, flonase as needed for congestion.

## 2024-02-20 NOTE — ASSESSMENT & PLAN NOTE
Uses CPAP nightly. Awakens refreshed and denies fatigue. Follows with Sleep medicine.   Discussed weight loss with lifestyle modifications.   Continue flonase as needed for congestion

## 2024-02-20 NOTE — ASSESSMENT & PLAN NOTE
Reviewed Lipid Panel.  Discussed lifestyle changes  C/w omega 3    Lipid       Lab Results   Component Value Date    CHOLESTEROL 205 (H) 10/07/2023    TRIG 172 (H) 10/07/2023    HDL 65 10/07/2023    LDLCALC 106 (H) 10/07/2023

## 2024-02-20 NOTE — PROGRESS NOTES
Name: Kalie Franco      : 1960      MRN: [id number]  Encounter Provider: Sinai Burt DO  Encounter Date: 2024   Encounter department: [facility]    Assessment & Plan     {There are no diagnoses linked to this encounter. (Refresh or delete this SmartLink)}       Subjective      HPI Kalie Franco is a 63-year-old female with a history of hypertension, hyperlipidemia, prediabetes, anemia and asthma presenting   Review of Systems    Current Outpatient Medications on File Prior to Visit   Medication Sig    albuterol (PROVENTIL HFA,VENTOLIN HFA) 90 mcg/act inhaler Inhale 1 puff every 6 (six) hours as needed for wheezing or shortness of breath    CALCIUM CITRATE PO Take by mouth    candesartan-hydrochlorothiazide (ATACAND HCT) 16-12.5 MG per tablet TAKE 1/2 TABLET BY MOUTH DAILY    fluticasone (FLONASE) 50 mcg/act nasal spray SPRAY 1 SPRAY INTO EACH NOSTRIL EVERY DAY    montelukast (SINGULAIR) 10 mg tablet TAKE 1 TABLET BY MOUTH DAILY AT BEDTIME    omega-3-acid ethyl esters (LOVAZA) 1 g capsule Take 2 g by mouth in the morning    travoprost (TRAVATAN-Z) 0.004 % ophthalmic solution INSTILL 1 DROP INTO BOTH EYES IN THE EVENING       Objective     There were no vitals taken for this visit.    Physical Exam  Sinai Burt DO

## 2024-03-18 ENCOUNTER — APPOINTMENT (OUTPATIENT)
Dept: LAB | Age: 64
End: 2024-03-18
Payer: COMMERCIAL

## 2024-03-18 DIAGNOSIS — J30.2 SEASONAL ALLERGIC RHINITIS, UNSPECIFIED TRIGGER: ICD-10-CM

## 2024-03-18 PROCEDURE — 82785 ASSAY OF IGE: CPT

## 2024-03-18 PROCEDURE — 86003 ALLG SPEC IGE CRUDE XTRC EA: CPT

## 2024-03-18 PROCEDURE — 36415 COLL VENOUS BLD VENIPUNCTURE: CPT

## 2024-03-20 LAB

## 2024-03-26 ENCOUNTER — OFFICE VISIT (OUTPATIENT)
Dept: FAMILY MEDICINE CLINIC | Facility: CLINIC | Age: 64
End: 2024-03-26

## 2024-03-26 VITALS
HEART RATE: 88 BPM | SYSTOLIC BLOOD PRESSURE: 103 MMHG | WEIGHT: 170.8 LBS | DIASTOLIC BLOOD PRESSURE: 71 MMHG | OXYGEN SATURATION: 99 % | HEIGHT: 60 IN | TEMPERATURE: 98 F | BODY MASS INDEX: 33.53 KG/M2 | RESPIRATION RATE: 18 BRPM

## 2024-03-26 DIAGNOSIS — J06.9 UPPER RESPIRATORY TRACT INFECTION, UNSPECIFIED TYPE: ICD-10-CM

## 2024-03-26 DIAGNOSIS — E66.9 CLASS 2 OBESITY: Primary | ICD-10-CM

## 2024-03-26 DIAGNOSIS — J45.20 MILD INTERMITTENT ASTHMA WITHOUT COMPLICATION: ICD-10-CM

## 2024-03-26 PROCEDURE — 87636 SARSCOV2 & INF A&B AMP PRB: CPT

## 2024-03-26 NOTE — ASSESSMENT & PLAN NOTE
Presents with 1 week of cough, runny nose, subjective fevers without focal lung exam findings, making PNA unlikely.   -Supportive care (fluids, tylenol prn, flonase)  -Use albuterol inhaler as needed for SOB  -Call office if symptoms continue after 10 days or worsen.

## 2024-03-26 NOTE — ASSESSMENT & PLAN NOTE
Stable. Uses singulair nightly. Has needed to use albuterol inhaler several times 2/2 respiratory illness.   - no PFT's on file, pt reports last done in [place] over 30 years ago     Plan  - Consider Repeat PFTs after acute viral illness resolution  - recommend control of allergies with singulair, zyrtec, flonase as needed for congestion.

## 2024-03-26 NOTE — PROGRESS NOTES
NO PROGRESSION - PROB NOT 2ND CNV -. Name: Kalie Franco      : 1960      MRN: [id number]  Encounter Provider: Sinai Burt DO  Encounter Date: 3/26/2024   Encounter department: [facility]    Assessment & Plan     1. Class 2 obesity  Assessment & Plan:  BMI 33.36, not able to adequately lose weight with diet and exercise alone. Currently prediabetic (HbA1c 6.2).   - Pt interested in trying lifestyle modifications another try. Motivated to exercise more now that the weather is improving  -consider starting Mounjaro 2.5 mg weekly if not losing weight despite weight loss lifestyle.   - Return in 1 month for weight check, goal is weight loss of 4 lbs in 4 weeks      2. Upper respiratory tract infection, unspecified type  Assessment & Plan:  Presents with 1 week of cough, runny nose, subjective fevers without focal lung exam findings, making PNA unlikely.   -Supportive care (fluids, tylenol prn, flonase)  -Use albuterol inhaler as needed for SOB  -Call office if symptoms continue after 10 days or worsen.     Orders:  -     Covid/Flu- Office Collect Normal; Future  -     Ambulatory Referral to Social Work Care Management Program; Future  -     Covid/Flu- Office Collect Normal    3. Mild intermittent asthma without complication  Assessment & Plan:  Stable. Uses singulair nightly. Has needed to use albuterol inhaler several times 2/2 respiratory illness.   - no PFT's on file, pt reports last done in [place] over 30 years ago     Plan  - Consider Repeat PFTs after acute viral illness resolution  - recommend control of allergies with singulair, zyrtec, flonase as needed for congestion.              Subjective       #[id number]     Kalie Franco is a 65 yo F with PMHx of pre-diabetes,asthma, and obesity that presents for weight check and upper respiratory symptoms.    She came to the office a month ago and was asked to make lifestyle changes to decrease weight by 4 lbs in 4 weeks. She was only able  to lose 2 lbs. She has not been able to improve her diet and exercise due to her illness and lifestyle. Agreeable to try GLP-1 agonist for one month.     Has been having wet cough, runny nose, fatigue, bilateral tinnitus, band like-headache for the past week. She has been having subjective fevers improved by tylenol and swollen eyes since the weekend. Went to the ophthalmology office on Friday, where they did not dilate her eyes. No N/V/D. Has been drinking water and urinating normally, but not able to eat as much as usual.    She takes montelukast every day. She has used albuterol thrice in the past week due to SOB from the illness. She recently got screening for allergies done. One panel came back negative, other results are still pending. She feels she is allergic to smog, seasonal allergies, strong smells, and feathers.       Review of Systems   Constitutional:  Positive for activity change, appetite change, fatigue and fever. Negative for unexpected weight change.   HENT:  Positive for congestion, rhinorrhea, sinus pressure, sinus pain and tinnitus.    Eyes:  Negative for pain and redness.   Respiratory:  Positive for cough, shortness of breath and wheezing.    Cardiovascular:  Negative for chest pain and palpitations.   Gastrointestinal:  Negative for constipation, diarrhea, nausea and vomiting.   Genitourinary:  Negative for decreased urine volume and difficulty urinating.   Allergic/Immunologic: Positive for environmental allergies.   Neurological:  Positive for headaches.       Current Outpatient Medications on File Prior to Visit   Medication Sig    albuterol (PROVENTIL HFA,VENTOLIN HFA) 90 mcg/act inhaler Inhale 1 puff every 6 (six) hours as needed for wheezing or shortness of breath    CALCIUM CITRATE PO Take by mouth    candesartan-hydrochlorothiazide (ATACAND HCT) 16-12.5 MG per tablet TAKE 1/2 TABLET BY MOUTH DAILY    fluticasone (FLONASE) 50 mcg/act nasal spray 1 spray into each nostril daily     montelukast (SINGULAIR) 10 mg tablet TAKE 1 TABLET BY MOUTH DAILY AT BEDTIME    omega-3-acid ethyl esters (LOVAZA) 1 g capsule Take 2 g by mouth in the morning    travoprost (TRAVATAN-Z) 0.004 % ophthalmic solution INSTILL 1 DROP INTO BOTH EYES IN THE EVENING       Objective     /71 (BP Location: Left arm, Patient Position: Sitting, Cuff Size: Large)   Pulse 88   Temp 98 °F (36.7 °C) (Temporal)   Resp 18   Ht 5' (1.524 m)   Wt 77.5 kg (170 lb 12.8 oz)   SpO2 99%   BMI 33.36 kg/m²     Physical Exam  Constitutional:       Appearance: Normal appearance.   HENT:      Head: Normocephalic.      Nose: Congestion and rhinorrhea present.   Eyes:      Pupils: Pupils are equal, round, and reactive to light.   Cardiovascular:      Rate and Rhythm: Normal rate and regular rhythm.      Heart sounds: No murmur heard.     No friction rub. No gallop.   Pulmonary:      Effort: Pulmonary effort is normal.      Breath sounds: No stridor. No wheezing, rhonchi or rales.   Abdominal:      General: There is no distension.      Palpations: Abdomen is soft.      Tenderness: There is no abdominal tenderness. There is no guarding.   Neurological:      Mental Status: She is alert.       Sinai Burt DO

## 2024-03-26 NOTE — ASSESSMENT & PLAN NOTE
BMI 33.36, not able to adequately lose weight with diet and exercise alone. Currently prediabetic (HbA1c 6.2).   - Pt interested in trying lifestyle modifications another try. Motivated to exercise more now that the weather is improving  -consider starting Mounjaro 2.5 mg weekly if not losing weight despite weight loss lifestyle.   - Return in 1 month for weight check, goal is weight loss of 4 lbs in 4 weeks

## 2024-03-27 LAB
FLUAV RNA RESP QL NAA+PROBE: NEGATIVE
FLUBV RNA RESP QL NAA+PROBE: NEGATIVE
SARS-COV-2 RNA RESP QL NAA+PROBE: POSITIVE

## 2024-04-09 ENCOUNTER — PATIENT OUTREACH (OUTPATIENT)
Dept: FAMILY MEDICINE CLINIC | Facility: CLINIC | Age: 64
End: 2024-04-09

## 2024-04-09 NOTE — PROGRESS NOTES
OP SWCM received referral 3/26 from Dr. Burt, DO r/t pt requesting assistance to get disability forms due to poor vision. Chart review completed. Per chart, pt was seen in office for f/u w/ cold-like symptoms and was COVID positive. Per chart, no vision issues recorded in chart. Per chart, pt is Divehi speaking.    OP SWCM placed call to pt using [employer]  #[id number] ([place]). OP SWCM received pt's VM and left VM with assistance from . OP SWCM introduced self, role, reason for calling and asked pt to return call.

## 2024-04-17 ENCOUNTER — PATIENT OUTREACH (OUTPATIENT)
Dept: FAMILY MEDICINE CLINIC | Facility: CLINIC | Age: 64
End: 2024-04-17

## 2024-04-17 NOTE — PROGRESS NOTES
2nd outreach attempt to contact pt w/ success. OP SWCM placed call using [employer] and spoke w/ pt with assistance from  #[phone].    Pt stated she was wanting assistance applying for social security disability. Pt expressed she has a niece that is going to go with her down to the social security office, but her niece is currently on vacation. OP SWCM asked pt if she was needing any information for where her local social security office is. Pt stated at first yes, but then stated she had the address and phone number and was just waiting on her niece to come back to be able to take her.    OP SWCM also advised pt that she can apply over the phone. OP SWCM provided pt with the number to apply. Pt advised she is still going to wait until her niece returns from vacation.    OP SWCM asked if pt needed any other assistance. Pt declined further assistance.    OP SWCM to close out referral as needs were met. OP SWCM remains available should pt need further assistance.

## 2024-04-30 ENCOUNTER — OFFICE VISIT (OUTPATIENT)
Dept: FAMILY MEDICINE CLINIC | Facility: CLINIC | Age: 64
End: 2024-04-30

## 2024-04-30 VITALS
DIASTOLIC BLOOD PRESSURE: 76 MMHG | TEMPERATURE: 98 F | HEART RATE: 87 BPM | RESPIRATION RATE: 18 BRPM | OXYGEN SATURATION: 98 % | SYSTOLIC BLOOD PRESSURE: 120 MMHG | HEIGHT: 60 IN | WEIGHT: 170.6 LBS | BODY MASS INDEX: 33.49 KG/M2

## 2024-04-30 DIAGNOSIS — E66.9 CLASS 2 OBESITY: ICD-10-CM

## 2024-04-30 DIAGNOSIS — R73.03 PREDIABETES: Primary | ICD-10-CM

## 2024-04-30 PROCEDURE — 99214 OFFICE O/P EST MOD 30 MIN: CPT | Performed by: FAMILY MEDICINE

## 2024-04-30 NOTE — ASSESSMENT & PLAN NOTE
Patient discussed eating changes, foods she has cut out (bread), and diet changes to help manage her weight. She noted eating animal based protein such as red meat and chicken although did not want to eat fish or turkey.     PLAN:  -  Continue on new diet and exercise regimen to lose weight and control blood glucose  - Patient was told of the fat content of beef but she was apprehensive of changing her preference.   - Declined nutritionist and weight loss medication at this time, would like to c/w lifestyle modifications.   - Will discuss adding weight loss medication and seeing a nutritionist at next visit should her weight not change or improve   - Goal to lose 0.5 lbs per week. Follow up in 4 weeks.

## 2024-04-30 NOTE — ASSESSMENT & PLAN NOTE
Lab Results   Component Value Date    HGBA1C 6.2 (H) 10/07/2023     Discussed lifestyle and diet changes for controlling her weight as well as A1c levels. As of now, she states that she has modified her diet and will be participating in swimming activities to help lose weight and stay healthy. Patient has deferred seeing a nutritionist and taking a weight loss medication    PLAN:  - Continue on new diet and exercise regimen to lose weight and control blood glucose  - Will discuss adding weight loss medication and seeing a nutritionist at next visit should her weight not change or improve  - repeat A1c

## 2024-04-30 NOTE — PROGRESS NOTES
Name: Kalie Franco      : 1960      MRN: [id number]  Encounter Provider: Sinai Burt DO  Encounter Date: 2024   Encounter department: [facility]    Assessment & Plan     1. Prediabetes  Assessment & Plan:    Lab Results   Component Value Date    HGBA1C 6.2 (H) 10/07/2023     Discussed lifestyle and diet changes for controlling her weight as well as A1c levels. As of now, she states that she has modified her diet and will be participating in swimming activities to help lose weight and stay healthy. Patient has deferred seeing a nutritionist and taking a weight loss medication    PLAN:  - Continue on new diet and exercise regimen to lose weight and control blood glucose  - Will discuss adding weight loss medication and seeing a nutritionist at next visit should her weight not change or improve  - repeat A1c    Orders:  -     Hemoglobin A1C; Future    2. Class 2 obesity  Assessment & Plan:  Patient discussed eating changes, foods she has cut out (bread), and diet changes to help manage her weight. She noted eating animal based protein such as red meat and chicken although did not want to eat fish or turkey.     PLAN:  -  Continue on new diet and exercise regimen to lose weight and control blood glucose  - Patient was told of the fat content of beef but she was apprehensive of changing her preference.   - Declined nutritionist and weight loss medication at this time, would like to c/w lifestyle modifications.   - Will discuss adding weight loss medication and seeing a nutritionist at next visit should her weight not change or improve   - Goal to lose 0.5 lbs per week. Follow up in 4 weeks.              Subjective      Ms. Franco is a 64 year old female presenting to the clinic today for follow up of her weight control and prediabetes status. She discussed changes made in her diet, including removing carbohydrates from her eating routine. She reports that she  had often felt bloated but since cutting down her eating has felt slightly better. She stated that she loves fish but does not want to eat any from [place] due to mercury and type of fish. She also stated that she often eats red meat (beef) and chicken and does not like turkey. She has also been swimming in an outdoor pool for exercise.        Review of Systems   Constitutional:  Positive for activity change.        Patient states that she has started swimming in outdoor pool   HENT: Negative.     Eyes: Negative.    Respiratory:  Positive for chest tightness and wheezing.         Occasional asthma exacerbation from environmental triggers   Cardiovascular: Negative.    Gastrointestinal: Negative.    Endocrine: Negative.    Genitourinary: Negative.    Musculoskeletal: Negative.    Allergic/Immunologic: Positive for environmental allergies.   Neurological: Negative.    Hematological: Negative.    Psychiatric/Behavioral: Negative.         Current Outpatient Medications on File Prior to Visit   Medication Sig    CALCIUM CITRATE PO Take by mouth    montelukast (SINGULAIR) 10 mg tablet TAKE 1 TABLET BY MOUTH DAILY AT BEDTIME    omega-3-acid ethyl esters (LOVAZA) 1 g capsule Take 2 g by mouth in the morning    albuterol (PROVENTIL HFA,VENTOLIN HFA) 90 mcg/act inhaler Inhale 1 puff every 6 (six) hours as needed for wheezing or shortness of breath    candesartan-hydrochlorothiazide (ATACAND HCT) 16-12.5 MG per tablet TAKE 1/2 TABLET BY MOUTH DAILY    fluticasone (FLONASE) 50 mcg/act nasal spray 1 spray into each nostril daily    travoprost (TRAVATAN-Z) 0.004 % ophthalmic solution INSTILL 1 DROP INTO BOTH EYES IN THE EVENING       Objective     /76   Pulse 87   Temp 98 °F (36.7 °C) (Temporal)   Resp 18   Ht 5' (1.524 m)   Wt 77.4 kg (170 lb 9.6 oz)   SpO2 98%   BMI 33.32 kg/m²     Physical Exam  Constitutional:       Appearance: Normal appearance.   HENT:      Head: Normocephalic.   Cardiovascular:      Rate  and Rhythm: Normal rate.      Pulses: Normal pulses.   Pulmonary:      Effort: Pulmonary effort is normal.   Genitourinary:     General: Normal vulva.   Musculoskeletal:      Cervical back: Normal range of motion.   Skin:     General: Skin is warm.   Neurological:      Mental Status: She is alert.   Psychiatric:         Mood and Affect: Mood normal.       Sinai Burt DO

## 2024-05-28 ENCOUNTER — TELEPHONE (OUTPATIENT)
Dept: FAMILY MEDICINE CLINIC | Facility: CLINIC | Age: 64
End: 2024-05-28

## 2024-06-26 DIAGNOSIS — J45.909 UNCOMPLICATED ASTHMA, UNSPECIFIED ASTHMA SEVERITY, UNSPECIFIED WHETHER PERSISTENT: ICD-10-CM

## 2024-06-27 RX ORDER — MONTELUKAST SODIUM 10 MG/1
10 TABLET ORAL
Qty: 90 TABLET | Refills: 1 | Status: SHIPPED | OUTPATIENT
Start: 2024-06-27

## 2024-06-30 DIAGNOSIS — I10 ESSENTIAL HYPERTENSION: ICD-10-CM

## 2024-06-30 DIAGNOSIS — E66.01 SEVERE OBESITY (BMI 35.0-39.9) WITH COMORBIDITY (HCC): ICD-10-CM

## 2024-06-30 DIAGNOSIS — E78.5 HYPERLIPIDEMIA, UNSPECIFIED HYPERLIPIDEMIA TYPE: ICD-10-CM

## 2024-07-02 RX ORDER — CANDESARTAN CILEXETIL AND HYDROCHLOROTHIAZIDE 16; 12.5 MG/1; MG/1
0.5 TABLET ORAL DAILY
Qty: 45 TABLET | Refills: 1 | Status: SHIPPED | OUTPATIENT
Start: 2024-07-02

## 2024-09-04 ENCOUNTER — OFFICE VISIT (OUTPATIENT)
Dept: SLEEP CENTER | Facility: CLINIC | Age: 64
End: 2024-09-04
Payer: COMMERCIAL

## 2024-09-04 VITALS
HEIGHT: 60 IN | BODY MASS INDEX: 32.98 KG/M2 | SYSTOLIC BLOOD PRESSURE: 130 MMHG | DIASTOLIC BLOOD PRESSURE: 80 MMHG | WEIGHT: 168 LBS

## 2024-09-04 DIAGNOSIS — G47.09 OTHER INSOMNIA: ICD-10-CM

## 2024-09-04 DIAGNOSIS — G47.62 NOCTURNAL LEG CRAMPS: ICD-10-CM

## 2024-09-04 DIAGNOSIS — G25.81 RLS (RESTLESS LEGS SYNDROME): ICD-10-CM

## 2024-09-04 DIAGNOSIS — E66.9 OBESITY (BMI 30-39.9): ICD-10-CM

## 2024-09-04 DIAGNOSIS — G47.33 OSA (OBSTRUCTIVE SLEEP APNEA): Primary | ICD-10-CM

## 2024-09-04 DIAGNOSIS — J45.909 UNCOMPLICATED ASTHMA, UNSPECIFIED ASTHMA SEVERITY, UNSPECIFIED WHETHER PERSISTENT: ICD-10-CM

## 2024-09-04 DIAGNOSIS — I10 PRIMARY HYPERTENSION: ICD-10-CM

## 2024-09-04 PROCEDURE — 99214 OFFICE O/P EST MOD 30 MIN: CPT | Performed by: INTERNAL MEDICINE

## 2024-09-04 NOTE — PROGRESS NOTES
Follow-Up Note - Sleep Center   Kalie Franco  64 y.o. female  :1960  MRN:[id number]  DOS:2024    CC: I saw this patient for follow-up in clinic today for Sleep disordered breathing, Coexisting Sleep and Medical Problems.. Interval changes: None reported.      The diagnostic portion demonstrated: AHI of 75.9 per hour, Minimum oxygen saturation was 86%.  During the therapeutic portion of the study, his sleep disordered breathing was successfully remediated with nasal CPAP at 13 cm H2O.    PFSH, Problem List, Medications & Allergies were reviewed in EMR.   She  has a past medical history of Anemia and Hypertension.    She has a current medication list which includes the following prescription(s): albuterol, calcium citrate, candesartan-hydrochlorothiazide, fluticasone, montelukast, omega-3-acid ethyl esters, and travoprost.    PHYSIOLOGICAL DATA REVIEW : Using PAP > 4 hours/night 97%. Estimated FRANK 1/hour with pressure of 12.7cm H2O@90th/95th percentile; patient has not been using non FDA approved devices to sanitize the machine.  INTERPRETATION: Compliance is excellent; Pressure setting is:optimal; ;   SUBJECTIVE: With respect to use of PAP, Kalie  is experiencing no adverse effects:.She derives benefit.  Is satisfied with sleep and daytime function.   Sleep Routine: Kalie reports getting 7 hrs sleep; she no longer has difficulty initiating and maintaining sleep . She arises spontaneously and feels more refreshed since on Rx.Kalie denies excessive daytime sleepiness,.  She rated herself at Total score: 6 /24 on the [place] Sleepiness Scale.   Other issues: Restless leg symptoms and leg cramps occur infrequently..     Habits: Reports that she has never smoked. She has never used smokeless tobacco.,  Reports that she does not currently use alcohol.,  Reports no history of drug use., Caffeine use:limited until  ; Exercise routine: regular.      ROS: Significant for weight has been stable.  She recently had  COVID causing exacerbation of her asthma.  Presently allergies and asthma are controlled.  Review of systems was otherwise negative.    EXAM: /80   Ht 5' (1.524 m)   Wt 76.2 kg (168 lb)   BMI 32.81 kg/m²     Wt Readings from Last 3 Encounters:   09/04/24 76.2 kg (168 lb)   04/30/24 77.4 kg (170 lb 9.6 oz)   03/26/24 77.5 kg (170 lb 12.8 oz)      Patient is well groomed; well appearing.   CNS: Alert, orientated, speech clear & coherent  Psych: cooperative and in no distress. Mental state: Appears normal.  H&N: EOMI; NC/AT: No facial pressure marks, no rashes.    Skin/Extrem: col & hydration normal; no edema  Resp: Respiratory effort is normal  Physical findings otherwise essentially unchanged from previous.    IMPRESSION: Problem List Items & Comorbidities Addressed this Visit    1. TOM (obstructive sleep apnea)        2. Other insomnia        3. RLS (restless legs syndrome)        4. Nocturnal leg cramps        5. Uncomplicated asthma, unspecified asthma severity, unspecified whether persistent        6. Primary hypertension        7. Obesity (BMI 30-39.9)        1-4 improved    PLAN:  I reviewed results of prior studies and physiologic data with the patient.   I discussed treatment options with risks and benefits.  Treatment with  PAP is medically necessary and Kalie is agreable to continue use.   Care of equipment, methods to improve comfort using PAP and importance of compliance with therapy were discussed.  Pressure setting: continue 11-13 cmH2O.    Rx provided to replace supplies and Care coordinated with DME provider.   Discussed strategies for weight reduction.    Follow-up is advised in 1 year or sooner if needed to monitor progress, compliance and to adjust therapy.     Thank you for allowing me to participate in the care of this patient.    Sincerely,     Authenticated electronically on 09/04/24   Board Certified Specialist     Portions of the record may have been created with voice recognition  "software. Occasional wrong word or \"sound a like\" substitutions may have occurred due to the inherent limitations of voice recognition software. There may also be notations and random deletions of words or characters from malfunctioning software. Read the chart carefully and recognize, using context, where substitutions/deletions have occurred.    "

## 2024-09-07 ENCOUNTER — TELEPHONE (OUTPATIENT)
Dept: SLEEP CENTER | Facility: CLINIC | Age: 64
End: 2024-09-07

## 2024-09-07 LAB
DME PARACHUTE DELIVERY DATE REQUESTED: NORMAL
DME PARACHUTE ITEM DESCRIPTION: NORMAL
DME PARACHUTE ORDER STATUS: NORMAL
DME PARACHUTE SUPPLIER NAME: NORMAL
DME PARACHUTE SUPPLIER PHONE: NORMAL

## 2024-10-07 NOTE — PROGRESS NOTES
Ambulatory Visit  Name: Kalie Franco      : 1960      MRN: [id number]  Encounter Provider: Shae Yoon DO  Encounter Date: 10/8/2024   [facility]    Assessment & Plan  Mild intermittent asthma without complication  Controlled on Singulair, rare use of rescue inhaler          Uncomplicated asthma, unspecified asthma severity, unspecified whether persistent    Orders:    albuterol (PROVENTIL HFA,VENTOLIN HFA) 90 mcg/act inhaler; Inhale 1 puff every 6 (six) hours as needed for wheezing or shortness of breath    montelukast (SINGULAIR) 10 mg tablet; Take 1 tablet (10 mg total) by mouth daily at bedtime    TOM (obstructive sleep apnea)  Compliant with CPAP, following with sleep medicine          Hyperlipidemia, unspecified hyperlipidemia type    Orders:    candesartan-hydrochlorothiazide (ATACAND HCT) 16-12.5 MG per tablet; Take 0.5 tablets by mouth daily    Lipid Panel with Direct LDL reflex; Future    Essential hypertension     Orders:    candesartan-hydrochlorothiazide (ATACAND HCT) 16-12.5 MG per tablet; Take 0.5 tablets by mouth daily    TSH, 3rd generation with Free T4 reflex; Future    Albumin / creatinine urine ratio; Future    BMI 32.0-32.9,adult  Diet/lifestyle modification discussed     Orders:    candesartan-hydrochlorothiazide (ATACAND HCT) 16-12.5 MG per tablet; Take 0.5 tablets by mouth daily    Seasonal allergic rhinitis, unspecified trigger     Orders:    fluticasone (FLONASE) 50 mcg/act nasal spray; 1 spray into each nostril daily    Prediabetes    Orders:    CBC and differential; Future    Comprehensive metabolic panel; Future    Hemoglobin A1C; Future    Glaucoma of both eyes, unspecified glaucoma type         Encounter for immunization    Orders:    Zoster Vaccine Recombinant IM       History of Present Illness     HPI    She reports she is feeling great, just needs refills of medications. Following with eye doctor for  glaucoma, using drops.       Review of Systems   All other systems reviewed and are negative.    Current Outpatient Medications on File Prior to Visit   Medication Sig Dispense Refill    CALCIUM CITRATE PO Take by mouth      omega-3-acid ethyl esters (LOVAZA) 1 g capsule Take 2 g by mouth in the morning      travoprost (TRAVATAN-Z) 0.004 % ophthalmic solution INSTILL 1 DROP INTO BOTH EYES IN THE EVENING      [DISCONTINUED] albuterol (PROVENTIL HFA,VENTOLIN HFA) 90 mcg/act inhaler Inhale 1 puff every 6 (six) hours as needed for wheezing or shortness of breath 18 g 2    [DISCONTINUED] candesartan-hydrochlorothiazide (ATACAND HCT) 16-12.5 MG per tablet TAKE 1/2 TABLET BY MOUTH DAILY 45 tablet 1    [DISCONTINUED] fluticasone (FLONASE) 50 mcg/act nasal spray 1 spray into each nostril daily 48 mL 1    [DISCONTINUED] montelukast (SINGULAIR) 10 mg tablet TAKE 1 TABLET BY MOUTH DAILY AT BEDTIME 90 tablet 1     No current facility-administered medications on file prior to visit.          Objective     /76   Pulse 76   Temp 98 °F (36.7 °C) (Temporal)   Resp 20   Ht 5' (1.524 m)   Wt 75.5 kg (166 lb 6.4 oz)   SpO2 99%   BMI 32.50 kg/m²     Physical Exam  Vitals reviewed.   Constitutional:       General: She is not in acute distress.     Appearance: Normal appearance. She is not ill-appearing, toxic-appearing or diaphoretic.   Cardiovascular:      Rate and Rhythm: Normal rate and regular rhythm.      Heart sounds: Normal heart sounds. No murmur heard.     No friction rub. No gallop.   Pulmonary:      Effort: Pulmonary effort is normal. No respiratory distress.      Breath sounds: Normal breath sounds. No stridor. No wheezing or rhonchi.   Neurological:      Mental Status: She is alert and oriented to person, place, and time.   Psychiatric:         Mood and Affect: Mood normal.         Behavior: Behavior normal.           Shae Yoon DO  [facility]    resume home meds

## 2024-10-08 ENCOUNTER — OFFICE VISIT (OUTPATIENT)
Dept: FAMILY MEDICINE CLINIC | Facility: CLINIC | Age: 64
End: 2024-10-08

## 2024-10-08 VITALS
RESPIRATION RATE: 20 BRPM | TEMPERATURE: 98 F | BODY MASS INDEX: 32.67 KG/M2 | SYSTOLIC BLOOD PRESSURE: 114 MMHG | HEART RATE: 76 BPM | DIASTOLIC BLOOD PRESSURE: 76 MMHG | HEIGHT: 60 IN | OXYGEN SATURATION: 99 % | WEIGHT: 166.4 LBS

## 2024-10-08 DIAGNOSIS — J45.909 UNCOMPLICATED ASTHMA, UNSPECIFIED ASTHMA SEVERITY, UNSPECIFIED WHETHER PERSISTENT: ICD-10-CM

## 2024-10-08 DIAGNOSIS — J30.2 SEASONAL ALLERGIC RHINITIS, UNSPECIFIED TRIGGER: ICD-10-CM

## 2024-10-08 DIAGNOSIS — H40.9 GLAUCOMA OF BOTH EYES, UNSPECIFIED GLAUCOMA TYPE: ICD-10-CM

## 2024-10-08 DIAGNOSIS — I10 ESSENTIAL HYPERTENSION: ICD-10-CM

## 2024-10-08 DIAGNOSIS — G47.33 OSA (OBSTRUCTIVE SLEEP APNEA): ICD-10-CM

## 2024-10-08 DIAGNOSIS — J45.20 MILD INTERMITTENT ASTHMA WITHOUT COMPLICATION: Primary | ICD-10-CM

## 2024-10-08 DIAGNOSIS — E78.5 HYPERLIPIDEMIA, UNSPECIFIED HYPERLIPIDEMIA TYPE: ICD-10-CM

## 2024-10-08 DIAGNOSIS — R73.03 PREDIABETES: ICD-10-CM

## 2024-10-08 DIAGNOSIS — Z23 ENCOUNTER FOR IMMUNIZATION: ICD-10-CM

## 2024-10-08 PROBLEM — E66.812 CLASS 2 OBESITY: Status: RESOLVED | Noted: 2021-08-25 | Resolved: 2024-10-08

## 2024-10-08 PROBLEM — J06.9 UPPER RESPIRATORY TRACT INFECTION: Status: RESOLVED | Noted: 2024-03-26 | Resolved: 2024-10-08

## 2024-10-08 PROCEDURE — 90750 HZV VACC RECOMBINANT IM: CPT | Performed by: FAMILY MEDICINE

## 2024-10-08 PROCEDURE — 99214 OFFICE O/P EST MOD 30 MIN: CPT | Performed by: FAMILY MEDICINE

## 2024-10-08 PROCEDURE — 90471 IMMUNIZATION ADMIN: CPT | Performed by: FAMILY MEDICINE

## 2024-10-08 RX ORDER — ALBUTEROL SULFATE 90 UG/1
1 INHALANT RESPIRATORY (INHALATION) EVERY 6 HOURS PRN
Qty: 18 G | Refills: 5 | Status: SHIPPED | OUTPATIENT
Start: 2024-10-08

## 2024-10-08 RX ORDER — FLUTICASONE PROPIONATE 50 MCG
1 SPRAY, SUSPENSION (ML) NASAL DAILY
Qty: 48 ML | Refills: 3 | Status: SHIPPED | OUTPATIENT
Start: 2024-10-08

## 2024-10-08 RX ORDER — MONTELUKAST SODIUM 10 MG/1
10 TABLET ORAL
Qty: 90 TABLET | Refills: 3 | Status: SHIPPED | OUTPATIENT
Start: 2024-10-08

## 2024-10-08 RX ORDER — CANDESARTAN CILEXETIL AND HYDROCHLOROTHIAZIDE 16; 12.5 MG/1; MG/1
0.5 TABLET ORAL DAILY
Qty: 45 TABLET | Refills: 3 | Status: SHIPPED | OUTPATIENT
Start: 2024-10-08

## 2024-10-08 NOTE — ASSESSMENT & PLAN NOTE
Orders:    candesartan-hydrochlorothiazide (ATACAND HCT) 16-12.5 MG per tablet; Take 0.5 tablets by mouth daily    Lipid Panel with Direct LDL reflex; Future

## 2024-10-09 NOTE — ASSESSMENT & PLAN NOTE
Diet/lifestyle modification discussed     Orders:    candesartan-hydrochlorothiazide (ATACAND HCT) 16-12.5 MG per tablet; Take 0.5 tablets by mouth daily

## 2024-10-09 NOTE — ASSESSMENT & PLAN NOTE
Orders:    CBC and differential; Future    Comprehensive metabolic panel; Future    Hemoglobin A1C; Future

## 2024-10-09 NOTE — ASSESSMENT & PLAN NOTE
Orders:    candesartan-hydrochlorothiazide (ATACAND HCT) 16-12.5 MG per tablet; Take 0.5 tablets by mouth daily    TSH, 3rd generation with Free T4 reflex; Future    Albumin / creatinine urine ratio; Future

## 2024-10-18 ENCOUNTER — APPOINTMENT (OUTPATIENT)
Dept: LAB | Age: 64
End: 2024-10-18
Payer: COMMERCIAL

## 2024-10-18 DIAGNOSIS — R73.03 PREDIABETES: ICD-10-CM

## 2024-10-18 DIAGNOSIS — I10 ESSENTIAL HYPERTENSION: ICD-10-CM

## 2024-10-18 DIAGNOSIS — E78.5 HYPERLIPIDEMIA, UNSPECIFIED HYPERLIPIDEMIA TYPE: ICD-10-CM

## 2024-10-18 LAB
ALBUMIN SERPL BCG-MCNC: 4.3 G/DL (ref 3.5–5)
ALP SERPL-CCNC: 53 U/L (ref 34–104)
ALT SERPL W P-5'-P-CCNC: 29 U/L (ref 7–52)
ANION GAP SERPL CALCULATED.3IONS-SCNC: 11 MMOL/L (ref 4–13)
AST SERPL W P-5'-P-CCNC: 22 U/L (ref 13–39)
BASOPHILS # BLD AUTO: 0.03 THOUSANDS/ΜL (ref 0–0.1)
BASOPHILS NFR BLD AUTO: 0 % (ref 0–1)
BILIRUB SERPL-MCNC: 0.5 MG/DL (ref 0.2–1)
BUN SERPL-MCNC: 15 MG/DL (ref 5–25)
CALCIUM SERPL-MCNC: 9.4 MG/DL (ref 8.4–10.2)
CHLORIDE SERPL-SCNC: 100 MMOL/L (ref 96–108)
CHOLEST SERPL-MCNC: 205 MG/DL
CO2 SERPL-SCNC: 30 MMOL/L (ref 21–32)
CREAT SERPL-MCNC: 0.75 MG/DL (ref 0.6–1.3)
CREAT UR-MCNC: 147.1 MG/DL
EOSINOPHIL # BLD AUTO: 0.24 THOUSAND/ΜL (ref 0–0.61)
EOSINOPHIL NFR BLD AUTO: 3 % (ref 0–6)
ERYTHROCYTE [DISTWIDTH] IN BLOOD BY AUTOMATED COUNT: 13.2 % (ref 11.6–15.1)
EST. AVERAGE GLUCOSE BLD GHB EST-MCNC: 126 MG/DL
GFR SERPL CREATININE-BSD FRML MDRD: 83 ML/MIN/1.73SQ M
GLUCOSE P FAST SERPL-MCNC: 102 MG/DL (ref 65–99)
HBA1C MFR BLD: 6 %
HCT VFR BLD AUTO: 46.3 % (ref 34.8–46.1)
HDLC SERPL-MCNC: 59 MG/DL
HGB BLD-MCNC: 15 G/DL (ref 11.5–15.4)
IMM GRANULOCYTES # BLD AUTO: 0.01 THOUSAND/UL (ref 0–0.2)
IMM GRANULOCYTES NFR BLD AUTO: 0 % (ref 0–2)
LDLC SERPL CALC-MCNC: 108 MG/DL (ref 0–100)
LYMPHOCYTES # BLD AUTO: 3.6 THOUSANDS/ΜL (ref 0.6–4.47)
LYMPHOCYTES NFR BLD AUTO: 48 % (ref 14–44)
MCH RBC QN AUTO: 29.8 PG (ref 26.8–34.3)
MCHC RBC AUTO-ENTMCNC: 32.4 G/DL (ref 31.4–37.4)
MCV RBC AUTO: 92 FL (ref 82–98)
MICROALBUMIN UR-MCNC: <7 MG/L
MONOCYTES # BLD AUTO: 0.56 THOUSAND/ΜL (ref 0.17–1.22)
MONOCYTES NFR BLD AUTO: 8 % (ref 4–12)
NEUTROPHILS # BLD AUTO: 3.04 THOUSANDS/ΜL (ref 1.85–7.62)
NEUTS SEG NFR BLD AUTO: 41 % (ref 43–75)
NRBC BLD AUTO-RTO: 0 /100 WBCS
PLATELET # BLD AUTO: 250 THOUSANDS/UL (ref 149–390)
PMV BLD AUTO: 10.2 FL (ref 8.9–12.7)
POTASSIUM SERPL-SCNC: 3.8 MMOL/L (ref 3.5–5.3)
PROT SERPL-MCNC: 7.6 G/DL (ref 6.4–8.4)
RBC # BLD AUTO: 5.04 MILLION/UL (ref 3.81–5.12)
SODIUM SERPL-SCNC: 141 MMOL/L (ref 135–147)
T4 FREE SERPL-MCNC: 0.77 NG/DL (ref 0.61–1.12)
TRIGL SERPL-MCNC: 190 MG/DL
TSH SERPL DL<=0.05 MIU/L-ACNC: 5.89 UIU/ML (ref 0.45–4.5)
WBC # BLD AUTO: 7.48 THOUSAND/UL (ref 4.31–10.16)

## 2024-10-18 PROCEDURE — 80061 LIPID PANEL: CPT

## 2024-10-18 PROCEDURE — 36415 COLL VENOUS BLD VENIPUNCTURE: CPT

## 2024-10-18 PROCEDURE — 84439 ASSAY OF FREE THYROXINE: CPT

## 2024-10-18 PROCEDURE — 83036 HEMOGLOBIN GLYCOSYLATED A1C: CPT

## 2024-10-18 PROCEDURE — 85025 COMPLETE CBC W/AUTO DIFF WBC: CPT

## 2024-10-18 PROCEDURE — 80053 COMPREHEN METABOLIC PANEL: CPT

## 2024-10-18 PROCEDURE — 84443 ASSAY THYROID STIM HORMONE: CPT

## 2024-10-18 PROCEDURE — 82570 ASSAY OF URINE CREATININE: CPT

## 2024-10-18 PROCEDURE — 82043 UR ALBUMIN QUANTITATIVE: CPT

## 2024-10-21 DIAGNOSIS — E78.2 MIXED HYPERLIPIDEMIA: ICD-10-CM

## 2024-10-21 DIAGNOSIS — R73.03 PREDIABETES: Primary | ICD-10-CM

## 2024-10-28 NOTE — PROGRESS NOTES
Ambulatory Visit  Name: Kalie Franco      : 1960      MRN: [id number]  Encounter Provider: Shae Yoon DO  Encounter Date: 10/29/2024   [facility]    Assessment & Plan  Subclinical hypothyroidism    Orders:    TSH, 3rd generation with Free T4 reflex; Future    Thyroid Antibodies Panel; Future    US thyroid; Future    Mixed hyperlipidemia         Prediabetes            History of Present Illness   {?Quick Links Encounters * My Last Note * Last Note in Specialty * Snapshot * Since Last Visit * History :09608}  HPI    Subclinical hypothyroidism- TSH 5.8, free T4 normal 0.77. Family history thyroid cancer and thyroid disease.     Hyperlipidemia- Chol 205, Tri 190, HDL 59, . ASCVD risk score 5.9%.     Prediabetes- Fasting glucose 102, A1c 6.0.     {History obtained from (Optional):89409}  Review of Systems  {Select to Display PMH (Optional):03018}      Objective   {?Quick Links Trend Vitals * Enter New Vitals * Results Review * Timeline (Adult) * Labs * Imaging * Cardiology * Procedures * Lung Cancer Screening * Surgical eConsent :60927}  /85   Pulse 82   Temp 98 °F (36.7 °C) (Temporal)   Resp 20   Ht 5' (1.524 m)   Wt 75.3 kg (166 lb)   SpO2 99%   BMI 32.42 kg/m²     Physical Exam  {Administrative / Billing Section (Optional):78166}

## 2024-10-29 ENCOUNTER — OFFICE VISIT (OUTPATIENT)
Dept: FAMILY MEDICINE CLINIC | Facility: CLINIC | Age: 64
End: 2024-10-29

## 2024-10-29 VITALS
OXYGEN SATURATION: 99 % | RESPIRATION RATE: 20 BRPM | BODY MASS INDEX: 32.59 KG/M2 | HEART RATE: 82 BPM | HEIGHT: 60 IN | DIASTOLIC BLOOD PRESSURE: 85 MMHG | TEMPERATURE: 98 F | SYSTOLIC BLOOD PRESSURE: 137 MMHG | WEIGHT: 166 LBS

## 2024-10-29 DIAGNOSIS — E03.8 SUBCLINICAL HYPOTHYROIDISM: Primary | ICD-10-CM

## 2024-10-29 DIAGNOSIS — E78.2 MIXED HYPERLIPIDEMIA: ICD-10-CM

## 2024-10-29 DIAGNOSIS — R73.03 PREDIABETES: ICD-10-CM

## 2024-10-29 PROCEDURE — 99214 OFFICE O/P EST MOD 30 MIN: CPT | Performed by: FAMILY MEDICINE

## 2024-10-29 NOTE — ASSESSMENT & PLAN NOTE
Last HgbA1C 10/18/24 was 6.0 from 6.2 on 10/7/2023.   No diabetic medications at this time. Will continue to defer medication use, as prediabetic condition is considered stable at this time.   Pt stated she has been making diet modifications, including increasing protein intake and reducing carbohydrates. She endorses frequently eating fruit but has been trying to cut down.   Continue diet modifications at this time. Plan to recheck A1c in April.

## 2024-10-29 NOTE — PROGRESS NOTES
Ambulatory Visit  Name: Kalie Franco      : 1960      MRN: [id number]  Encounter Provider: Shae Yoon DO  Encounter Date: 10/29/2024   Encounter department: [facility]    Assessment & Plan  Subclinical hypothyroidism  Recent labs 10/18/24 showed elevated TSH of 5.888 and normal free T4 of 0.77  Considered subclinical hypothyroidism at this time.   Typically, observation is indicated with subclinical hypothyroidism unless sx are present. Pt has no overt hypothyroid sx such as sensitivity to cold, hair thinning/hair loss, depressed mood, or weight gain. She does, however, endorse dry skin and no weight change despite stating she has made multiple diet modifications to try to lose weight. Pt also has multiple family members with thyroid dysfunction.  PE of neck/thyroid was unremarkable.  Given that pt could be having mild sx related to hypothyroidism, we discussed with pt the possibility of starting low lose levothyroxine 25 mcg daily and recheck TSH/free T4 in 4 weeks versus deferring medication use and rechecking TSH/free T4 to assess for consistent elevation. After discussion, pt would like to defer medication use and recheck TSH and free T4 in 4-6 weeks. Will also obtain thyroid antibody panel to assess for possible etiology of thyroid dysfxn.  Additionally, plan to obtain thyroid ultrasound to assess for any structural changes, prior US in  showed stable subcentimeter right thyroid nodule and small thyroid.     Orders:    US thyroid; Future    TSH, 3rd generation with Free T4 reflex; Future    Thyroid Antibodies Panel; Future    BMI 32.0-32.9,adult  Discussed weight loss.        Mixed hyperlipidemia  Recent labs 10/18/2024 cholesterol panel showed an elevated cholesterol of 205, elevated TG of 190, normal HDL of 59, and elevated LDL of 108.  Calculated  ASCVD risk for this visit is 8.4%. Educated pt on decreasing foods high in cholesterol/fats as well  as increasing physical activity as tolerated. Pt also given written handout for additional recommendations. Pt acknowledged understanding.  Repeat lipid panel April. If persistently elevated recommend statin.        Prediabetes  Last HgbA1C 10/18/24 was 6.0 from 6.2 on 10/7/2023.   No diabetic medications at this time. Will continue to defer medication use, as prediabetic condition is considered stable at this time.   Pt stated she has been making diet modifications, including increasing protein intake and reducing carbohydrates. She endorses frequently eating fruit but has been trying to cut down.   Continue diet modifications at this time. Plan to recheck A1c in April.          History of Present Illness     Pt is a 63 yo F who presents as a follow up for lab results. Pt has no overt complaints at this time and generally feels well.   She reports actively trying to lose weight via diet modifications with higher intake of protein and less carbohydrates but did state she still has a sedentary lifestyle at this time. She reports that her weight has been unchanged.     Pt denied any temperature sensitivity, incr fatigue, hair thinning/hair loss, or changes in mood/depressed mood. She does endorse some dry skin.             Subclinical hypothyroidism- TSH 5.8, free T4 normal 0.77. Family history thyroid cancer and thyroid disease.     Hyperlipidemia- Chol 205, Tri 190, HDL 59, .     Prediabetes- Fasting glucose 102, A1c 6.0.     History obtained from : patient  Review of Systems   Constitutional:  Negative for activity change, appetite change, chills, diaphoresis, fatigue, fever and unexpected weight change (no change in weight despite stated diet modification.).   HENT:  Negative for trouble swallowing.    Respiratory:  Negative for chest tightness and shortness of breath.    Cardiovascular:  Negative for chest pain, palpitations and leg swelling.   Gastrointestinal:  Negative for abdominal pain.   Endocrine:  Negative for cold intolerance.   Musculoskeletal:  Negative for arthralgias.   Skin:  Negative for color change, pallor, rash and wound.        Dry skin.   Neurological:  Negative for weakness and headaches.   Psychiatric/Behavioral:  Negative for decreased concentration and dysphoric mood. The patient is not nervous/anxious.          Objective     /85   Pulse 82   Temp 98 °F (36.7 °C) (Temporal)   Resp 20   Ht 5' (1.524 m)   Wt 75.3 kg (166 lb)   SpO2 99%   BMI 32.42 kg/m²     Physical Exam  Vitals reviewed.   Constitutional:       General: She is not in acute distress.     Appearance: Normal appearance. She is not ill-appearing or diaphoretic.   HENT:      Head: Normocephalic and atraumatic.   Neck:      Thyroid: No thyroid mass, thyromegaly or thyroid tenderness.   Pulmonary:      Effort: Pulmonary effort is normal. No respiratory distress.   Lymphadenopathy:      Cervical: No cervical adenopathy.   Skin:     General: Skin is warm and dry.   Neurological:      Mental Status: She is alert and oriented to person, place, and time.   Psychiatric:         Mood and Affect: Mood normal.         Behavior: Behavior normal.

## 2024-10-29 NOTE — PATIENT INSTRUCTIONS
"Patient Education     Disminuir el riesgo de prediabetes y diabetes tipo 2   Conceptos Básicos   Redactado por los médicos y editores de [employer]   ¿Cuál es la diferencia entre la prediabetes y la diabetes? -- La diabetes es un trastorno que desequilibra la forma en que el cuerpo utiliza el azúcar.  Todas las células del cuerpo necesitan azúcar para funcionar normalmente. El azúcar entra en las células con la ayuda de macie hormona llamada insulina. La insulina se produce en el páncreas, un órgano ubicado en el área del estómago. Cuando el cuerpo de macie persona kishan de responder a la insulina normalmente, el azúcar en fauzia puede aumentar con el tiempo. Si el azúcar en fauzia aumenta lo suficiente, se desarrolla diabetes tipo 2. Morales-Sanchez puede causar otros problemas.  Los médicos usan el término \"prediabetes\" cuando el azúcar en fauzia de macie persona es superior a lo normal, laurie no lo suficientemente alto para llamarse diabetes. Las personas con prediabetes tienen un riesgo alto de desarrollar diabetes tipo 2 con el tiempo.  ¿Qué factores aumentan el riesgo de desarrollar prediabetes y diabetes? -- Existen algunas cosas que pueden aumentar el riesgo, entre ellas:   Tener exceso de peso corporal u obesidad, especialmente si tiene el exceso de peso en el área del estómago   No hacer suficiente actividad física   Fumar   Tener un familiar cercano con diabetes   Tener diabetes mariela el embarazo, llamada \"diabetes gestacional\"  ¿Hay formas de reducir mi riesgo? -- Sí. Para reducir jag posibilidades de desarrollar prediabetes o diabetes, lo más importante que puede hacer es tener macie alimentación saludable y realizar mucha actividad física. Morales-Sanchez puede ayudarlo a adelgazar si tiene exceso de peso corporal. Sin embargo, comer lorin y hacer actividad también es palomo para cruz francisco en general, ya sea que baje de peso o no. Incluso la actividad leve, sharif caminar, tiene beneficios.  Si fuma, dejar de fumar también puede reducir " "cruz riesgo. [place] puede ser difícil, laurie cruz médico o enfermero puede ayudar. Dejar de fumar también disminuye los riesgos de accidente cerebrovascular (derrame), enfermedad coronaria y muchos otros problemas.  ¿Cómo sé si tengo prediabetes? -- Existen 3 pruebas que pueden mostrar si cruz nivel de azúcar en fauzia es más alto de lo normal. Estas pruebas miden el azúcar en fauzia de diferentes maneras.  Son las siguientes:   Prueba de glucosa en ayunas - \"Glucosa\" es el término médico para el azúcar. Esta prueba mide cruz azúcar en fauzia después de no solomon comido ni bebido nada (excepto agua) mariela al menos 8 horas. Las personas con prediabetes tienen macie glucosa en ayunas de entre 100 y 125 (tabla 1).   Prueba de tolerancia a la glucosa - Para esta prueba, no debe comer ni beber nada mariela 8 a 12 horas, laurie luego, sharif parte de la prueba, debe isaura macie bebida con azúcar. Dos horas después, un médico o enfermero le elza macie muestra de fauzia para medir cuánto le subió el azúcar en fauzia. Las personas con prediabetes tienen niveles de azúcar en fauzia de entre 140 y 199 mariela esta prueba (tabla 1).   Prueba de hemoglobina A1C (o simplemente \"A1C\") - Esta prueba se puede realizar en cualquier momento, incluso si ha comido recientemente. Es macie prueba que muestra cuál ha sido cruz nivel promedio de azúcar en fauzia en los últimos 2 a 3 meses. Las personas con prediabetes tienen niveles de A1C de entre 5.7 y 6.4.  ¿Qué yuko hacer si tengo prediabetes? -- Si tiene prediabetes, puede hacer cambios en cruz estilo de lara para disminuir las probabilidades de desarrollar diabetes. Puede hacer lo siguiente:   Alimentarse en forma christina - Intente tener macie alimentación con muchas frutas, verduras y productos lácteos descremados y baja en niels, dulces y cereales refinados. Si no tiene acceso a frutas y verduras frescas, puede consumirlas congeladas. Intente evitar las bebidas dulces, sharif las gaseosas y los jugos.  Si está por " encima de cruz objetivo de peso corporal, tratar de lograr un peso saludable puede ser de ayuda. Cruz médico o enfermero puede ayudarle a encontrar modos saludables de lograrlo.  Si baja entre un 5 y 10 por ciento de crzu peso corporal puede disminuir el riesgo en gran medida. Por ejemplo:   Si pesa 200 libras (91 kg), esto significa bajar de 10 a 20 libras (4.5 a 9 kg).   Si pesa 150 libras (68 kg), esto significa bajar de 7 a 15 libras (3 a 7 kg).   Hacer ejercicio mariela 30 minutos al día - No es necesario que vaya al gimnasio ni que deven ejercicio intenso para beneficiarse. Actividades tales sharif caminar, ocuparse del jardín y bailar pueden ayudar a mejorar la francisco.   Dejar de fumar - Si fuma, pídale a cruz médico o enfermero que le dé consejos para dejar de fumar. Es más probable que las personas lo logren si reciben ayuda y char medicinas para dejar de fumar.  ¿Pueden las medicinas ayudar a reducir mi riesgo de diabetes? -- A veces sí. Por lo general, los médicos recomiendan probar alise con cambios en el estilo de lara, laurie si estos cambios no son suficientemente útiles, es posible que cruz médico le recete medicinas. Si es así, siga todas las instrucciones para tomarlas.  Según cruz situación particular, es posible que reciba medicinas para:   Ayudar a reducir el azúcar en fauzia   Ayudar a bajar de peso, si tiene exceso de peso corporal u obesidad   Bajar la presión arterial o el colesterol - La prediabetes también aumenta el riesgo de infartos, accidentes cerebrovasculares (derrames) y otros problemas, por lo que estas medicinas son importantes.  Todos los artículos se actualizan a medida que se descubre nueva evidencia y culmina nuestro proceso de evaluación por homólogos   Consuelo artículo se recuperó de UpToDate el: Feb 26, 2024.  Artículo 88089 Versión 13.0.es-419.1  Release: 32.2.4 - C32.56  © 2024 UpToDate, Inc. Todos los derechos reservados.  tabla 1: Pruebas para detección de la diabetes     Nivel de  "glucosa    Prueba de glucosa en ayunas    Normal 70 a 99   Prediabetes 100 a 125   Diabetes 126 o más en al menos 2 pruebas   Prueba de tolerancia a la glucosa    Normal Menos de 140   Prediabetes 140 a 199   Diabetes 200 o más en al menos 2 pruebas      Porcentaje de A1C    A1C    Normal Por debajo de 5,7   Prediabetes 5,7 a 6,4   Diabetes 6,5 o más en al menos 2 pruebas   \"Prediabetes\" es un término que médicos o enfermeros utilizan sharif advertencia. Las personas con prediabetes todavía no tienen diabetes, laurie corren un riesgo mayor de contraerla.  Gráfico 55594 Versión 2.0  Exención de responsabilidad y uso de la información del consumidor   Descargo de responsabilidad: esta información generalizada es un resumen limitado de información sobre el diagnóstico, el tratamiento y/o los medicamentos. No pretende ser exhaustiva y se debe utilizar sharif herramienta para ayudar al usuario a comprender y/o evaluar las posibles opciones de diagnóstico y tratamiento. No incluye toda la información sobre afecciones, tratamientos, medicamentos, efectos secundarios o riesgos puedan ser aplicables a un paciente específico. No tiene el propósito de servir sharif recomendación médica ni de sustituir la recomendación médica, el diagnóstico o el tratamiento de un profesional de atención médica que se base en el examen y la evaluación de roger profesional de la francisco respecto a las circunstancias específicas y únicas del paciente. Los pacientes deben hablar con un profesional de atención médica para obtener información completa sobre cruz francisco, cuestiones médicas y opciones de tratamiento, incluidos los riesgos o los beneficios relacionados con el uso de medicamentos. Esta información no certifica que los tratamientos o medicamentos malick seguros, eficaces o estén aprobados para tratar a un paciente específico. UpToDate, Inc. y jag afiliados renuncian a cualquier garantía o responsabilidad relacionada con esta información o el uso de la " "misma.El uso de esta información está sujeto a las Condiciones de uso, disponibles en https://www.[employer].com/en/know/clinical-effectiveness-terms. 2024© UpToDate, Inc. y jag afiliados y/o licenciantes. Todos los derechos reservados.  Copyright   © 2024 [employer] Todos los derechos reservados.          Patient Education     Colesterol alto   Conceptos Básicos   Redactado por los médicos y editores de UpToDate   ¿Qué es el colesterol? -- El colesterol es macie sustancia que se encuentra en la fauzia. Todos tenemos un poco de colesterol, ya que es necesario para tener buena francisco. El problema es que a veces las personas tienen demasiado colesterol.  En comparación con las personas con colesterol normal, las personas con colesterol alto corren mayor riesgo de infarto, accidente cerebrovascular (derrame) y otros problemas de francisco. A más colesterol, más riesgo de tener estos problemas.  ¿Existen distintas clases de colesterol? -- Sí, hay distintas clases. Si le hacen macie prueba de colesterol, es posible que escuche a cruz médico o enfermero hablar de:   Colesterol total   Colesterol LDL - Algunos lo llaman colesterol \"lavern\". Eso es porque tener niveles altos de LDL aumenta cruz riesgo de infarto, accidente cerebrovascular (derrame) y otros problemas de francisco.   Colesterol HDL - Algunos lo llaman colesterol \"palomo\". Eso es porque las personas con niveles altos de HDL tienden a tener un reducir riesgo de infarto, accidente cerebrovascular (derrame) y otros problemas de francisco.   Colesterol no HDL - El colesterol no HDL es cruz colesterol total menos cruz colesterol HDL.   Triglicéridos - Los triglicéridos no son colesterol. Son otro tipo de grasa, laurie a menudo se miden junto con el colesterol. (Los triglicéridos altos también parecen aumentar el riesgo de infarto y accidente cerebrovascular [derrame]).  ¿Cuáles deberían ser mis números? -- Pregunte a cruz médico o enfermero cuáles deberían ser jag números. Cada persona " "necesita distintas metas. Si vive fuera de [place] consulte la tabla (tabla 1).  En general, las personas que no tienen enfermedad cardíaca deberían apuntar a tener:   Nivel de colesterol total cristhian de 200   Nivel de colesterol LDL cristhian de 130, o mucho más bajo, si tienen riesgo de infarto o accidente cerebrovascular (derrame)   Nivel de colesterol HDL mayor de 60   Nivel de colesterol no HDL cristhian de 160, o más bajo, si tienen riesgo de infarto o accidente cerebrovascular (derrame)   Nivel de triglicéridos cristhian de 150  Recuerde, sin embargo, que muchas personas que no pueden alcanzar estas metas siguen teniendo bajo riesgo de infarto y accidente cerebrovascular (derrame).  ¿Qué elisa hacer si tengo colesterol alto? -- Pregúntele a cruz médico cuál es cruz riesgo general de infarto y accidente cerebrovascular (derrame). El simple hecho de tener colesterol alto no siempre es motivo de preocupación. Tener colesterol alto es solo macie de muchas cosas que pueden aumentar cruz riesgo de infarto y accidente cerebrovascular (derrame).  Otras cosas que aumentan cruz riesgo incluyen:   Fumar   Presión arterial juan luis   Tener un padre, madre, hermana o ophelia que desarrolló enfermedad cardíaca siendo joven - Joven, en roger jonn, significa cristhian de 55 años en las personas de sexo masculino y cristhian de 65 años en las personas de sexo femenino.   Macie dieta no saludable para el corazón - Macie dieta \"saludable para el corazón\" incluye muchas frutas y verduras, fibra y grasas saludables (sharif las del pescado, los tr secos y ciertos aceites). Además, en roger tipo de dieta se limita el consumo de azúcar y grasas no saludables.   Edad avanzada  Si tiene riesgo alto de infarto y accidente cerebrovascular (derrame), tener el colesterol alto es un problema. Sin embargo, si cruz riesgo es bajo, es posible que el colesterol alto no necesite tratamiento.  ¿Elisa isaura medicinas para reducir el colesterol? -- No todas las personas que tienen " "colesterol alto deben isaura medicinas. Cruz médico o enfermero decidirá si las necesita de acuerdo con cruz edad, cruz historia familiar y otras cuestiones de francisco.  Hay muchas medicinas distintas que se usan para reducir el colesterol (tabla 2). Algunas ayudan al organismo a producir menos colesterol. Algunas impiden que el organismo absorba el colesterol de los alimentos. Algunas ayudan al organismo a eliminar el colesterol más rápido. Las medicinas que más se suelen usar para tratar el colesterol alto se llaman \"estatinas\".  Probablemente deba isaura macie estatina si:   Ya tuvo un infarto o accidente cerebrovascular (derrame)   Se sabe que tiene macie enfermedad cardíaca   Tiene diabetes   Tiene un padecimiento llamado \"enfermedad arterial periférica\", que causa dolor al caminar y se produce cuando las arterias de las piernas se bloquean con depósitos grasos   Tiene un \"aneurisma aórtico abdominal\", que es un ensanchamiento de la arteria principal del área del estómago  La mayoría de las personas con alguno de los padecimientos mencionados arriba debe isaura estatina, sin importar cruz nivel de colesterol. Si cruz médico o enfermero le receta macie estatina, es importante que siga tomándola. Es probable que no sienta ningún cambio con esta medicina, laurie puede ayudar a prevenir los infartos, los accidentes cerebrovasculares (derrames) y la muerte.  Si cruz médico o enfermero le recomienda que use macie medicina sharif ayuda para bajar el colesterol, asegúrese de saber cómo se llama. Siga todas las instrucciones sobre cómo usarla. Por ejemplo, algunas medicinas actúan mejor si se las usa por la noche. Algunas se deben isaura con la comida.  Dígale a cruz médico o enfermero si la medicina le causa algún efecto secundario molesto. Podría cambiarla por otra.  ¿Puedo bajar mi colesterol sin medicinas? -- Sí. Para ayudar a reducir el colesterol puede hacer lo siguiente:   Puede bajar el colesterol LDL, o \"lavern\", si ester las niels kaye, la " mantequilla, las frituras, el queso y otros alimentos que tienen mucha grasa saturada.   Para reducir los triglicéridos, evite los alimentos azucarados, las comidas fritas y el exceso de alcohol.   Si tiene sobrepeso, bajar de peso puede ayudar. Cruz médico o enfermero puede ayudarlo a hacerlo de forma saludable.   Trate de hacer actividad física regularmente. Incluso los tipos de ejercicio suaves, sharif caminar, son buenos para la francisco.  Aun si estas medidas no logran bajar mucho cruz colesterol, pueden mejorar cruz francisco de muchas otras formas.  Todos los artículos se actualizan a medida que se descubre nueva evidencia y culmina nuestro proceso de evaluación por homólogos   Consuelo artículo se recuperó de UpToDate el: Mar 01, 2024.  Artículo 74589 Versión 23.0.es-419.1  Release: 32.2.4 - C32.59  © 2024 UpToDate, Inc. Todos los derechos reservados.  tabla 1: Medidas del colesterol y de los triglicéridos utilizadas en [place] y en los demás países     Medida utilizada en [place] Miligramos/decilitro (mg/dL)  Medida utilizada en la mayoría de las ubicaciones fuera de [place] Milimoles/litro (mmol/litro)     Nivel al que apuntar  Nivel al que apuntar    Colesterol total  Menos de 200 Menos de 5.17   Colesterol LDL  Menos de 130, o mucho menos si corre riesgo de infarto y accidente cerebrovascular (derrame) Menos de 3.36, o mucho menos si corre riesgo de infarto y accidente cerebrovascular (derrame)   Colesterol HDL  Más de 60 Más de 1.55   Triglicéridos  Menos de 150 Menos de 1.7   En los [place] el colesterol se mide de manera diferente que en la mayoría de los demás países. En esta tabla se muestran los valores utilizados en [place] y en otros países. Se incluyen los niveles de colesterol y triglicéridos óptimos para la mayoría de las personas que no tienen enfermedad cardíaca.  Gráfico 08691 Versión 5.0  tabla 2: Medicinas para bajar los lípidos  Nombre genérico  Karen comercial    Estatinas    Atorvastatina Lipitor    Fluvastatina Lescol, Lescol XL   Lovastatina Mevacor, Altoprev   Pitavastatina Livalo   Pravastatina Pravachol   Rosuvastatina Crestor   Simvastatina Zocor   Inhibidores de la PCSK9    Alirocumab Praluent   Evolocumab Repatha, Repatha SureClick   Inhibidores de la absorción del colesterol    Ezetimiba Zetia   Secuestrantes de ácidos biliares    Colestiramina Prevalite, Questran, Questran Light   Colesevelam Welchol   Colestipol Colestid   Niacina (ácido nicotínico)    Niacina de liberación inmediata     Niacina de liberación prolongada Niaspan   Fibratos    Fenofibrato Fenoglide, Tricor, Triglide y otros   Gemfibrozil Lopid   Las marcas comerciales mencionadas corresponden a medicinas disponibles en [place] y algunos otros países.  Gráfico 83266 Versión 7.0  Exención de responsabilidad y uso de la información del consumidor   Descargo de responsabilidad: esta información generalizada es un resumen limitado de información sobre el diagnóstico, el tratamiento y/o los medicamentos. No pretende ser exhaustiva y se debe utilizar sharif herramienta para ayudar al usuario a comprender y/o evaluar las posibles opciones de diagnóstico y tratamiento. No incluye toda la información sobre afecciones, tratamientos, medicamentos, efectos secundarios o riesgos puedan ser aplicables a un paciente específico. No tiene el propósito de servir sharif recomendación médica ni de sustituir la recomendación médica, el diagnóstico o el tratamiento de un profesional de atención médica que se base en el examen y la evaluación de roger profesional de la francisco respecto a las circunstancias específicas y únicas del paciente. Los pacientes deben hablar con un profesional de atención médica para obtener información completa sobre cruz francisco, cuestiones médicas y opciones de tratamiento, incluidos los riesgos o los beneficios relacionados con el uso de medicamentos. Esta información no certifica que los tratamientos o medicamentos malick seguros, eficaces  o estén aprobados para tratar a un paciente específico. [employer] y jag afiliados renuncian a cualquier garantía o responsabilidad relacionada con esta información o el uso de la misma.El uso de esta información está sujeto a las Condiciones de uso, disponibles en https://www.[employer].com/en/know/clinical-effectiveness-terms. 2024© [employer] y jag afiliados y/o licenciantes. Todos los derechos reservados.  Copyright   © 2024 [employer] Todos los derechos reservados.

## 2024-10-29 NOTE — ASSESSMENT & PLAN NOTE
Recent labs 10/18/2024 cholesterol panel showed an elevated cholesterol of 205, elevated TG of 190, normal HDL of 59, and elevated LDL of 108.  Calculated  ASCVD risk for this visit is 8.4%. Educated pt on decreasing foods high in cholesterol/fats as well as increasing physical activity as tolerated. Pt also given written handout for additional recommendations. Pt acknowledged understanding.  Repeat lipid panel April. If persistently elevated recommend statin.

## 2024-10-30 ENCOUNTER — HOSPITAL ENCOUNTER (OUTPATIENT)
Dept: RADIOLOGY | Facility: IMAGING CENTER | Age: 64
Discharge: HOME | End: 2024-10-30
Payer: COMMERCIAL

## 2024-10-30 DIAGNOSIS — E03.8 SUBCLINICAL HYPOTHYROIDISM: ICD-10-CM

## 2024-10-30 PROCEDURE — 76536 US EXAM OF HEAD AND NECK: CPT

## 2024-12-12 ENCOUNTER — APPOINTMENT (OUTPATIENT)
Dept: LAB | Age: 64
End: 2024-12-12
Payer: COMMERCIAL

## 2024-12-12 DIAGNOSIS — E03.8 SUBCLINICAL HYPOTHYROIDISM: ICD-10-CM

## 2024-12-12 LAB
T4 FREE SERPL-MCNC: 0.74 NG/DL (ref 0.61–1.12)
TSH SERPL DL<=0.05 MIU/L-ACNC: 4.75 UIU/ML (ref 0.45–4.5)

## 2024-12-12 PROCEDURE — 84443 ASSAY THYROID STIM HORMONE: CPT

## 2024-12-12 PROCEDURE — 84439 ASSAY OF FREE THYROXINE: CPT

## 2024-12-12 PROCEDURE — 36415 COLL VENOUS BLD VENIPUNCTURE: CPT

## 2024-12-12 PROCEDURE — 86376 MICROSOMAL ANTIBODY EACH: CPT

## 2024-12-12 PROCEDURE — 86800 THYROGLOBULIN ANTIBODY: CPT

## 2024-12-13 LAB
THYROGLOB AB SERPL-ACNC: <1 IU/ML (ref 0–0.9)
THYROPEROXIDASE AB SERPL-ACNC: <9 IU/ML (ref 0–34)

## 2024-12-14 ENCOUNTER — RESULTS FOLLOW-UP (OUTPATIENT)
Dept: OTHER | Facility: HOSPITAL | Age: 64
End: 2024-12-14

## 2024-12-20 DIAGNOSIS — E03.8 SUBCLINICAL HYPOTHYROIDISM: Primary | ICD-10-CM

## 2025-01-15 ENCOUNTER — OFFICE VISIT (OUTPATIENT)
Dept: FAMILY MEDICINE CLINIC | Facility: CLINIC | Age: 65
End: 2025-01-15

## 2025-01-15 VITALS
HEIGHT: 60 IN | HEART RATE: 87 BPM | RESPIRATION RATE: 18 BRPM | DIASTOLIC BLOOD PRESSURE: 78 MMHG | SYSTOLIC BLOOD PRESSURE: 122 MMHG | BODY MASS INDEX: 33.06 KG/M2 | WEIGHT: 168.4 LBS | OXYGEN SATURATION: 96 % | TEMPERATURE: 97.8 F

## 2025-01-15 DIAGNOSIS — H57.12 ACUTE PAIN IN LEFT EYE: Primary | ICD-10-CM

## 2025-01-15 DIAGNOSIS — I10 ESSENTIAL HYPERTENSION: ICD-10-CM

## 2025-01-15 PROCEDURE — 99213 OFFICE O/P EST LOW 20 MIN: CPT

## 2025-01-15 PROCEDURE — 3074F SYST BP LT 130 MM HG: CPT

## 2025-01-15 PROCEDURE — 3078F DIAST BP <80 MM HG: CPT

## 2025-01-15 RX ORDER — CANDESARTAN CILEXETIL AND HYDROCHLOROTHIAZIDE 16; 12.5 MG/1; MG/1
0.5 TABLET ORAL DAILY
Qty: 45 TABLET | Refills: 3 | Status: SHIPPED | OUTPATIENT
Start: 2025-01-15

## 2025-01-15 RX ORDER — TRAVOPROST OPHTHALMIC SOLUTION 0.04 MG/ML
1 SOLUTION OPHTHALMIC DAILY
Qty: 1.5 ML | Refills: 0 | Status: SHIPPED | OUTPATIENT
Start: 2025-01-15

## 2025-01-15 NOTE — ASSESSMENT & PLAN NOTE
Blood pressure acceptable 122/78 mmHg.  Patient is compliant with her home blood pressure medicine  Medication refill provided.    Orders:    candesartan-hydrochlorothiazide (ATACAND HCT) 16-12.5 MG per tablet; Take 0.5 tablets by mouth daily

## 2025-01-15 NOTE — ASSESSMENT & PLAN NOTE
History of cataract and glaucoma per previous ophthalmologist.  Using travoprost eyedrops (1 drop to each eye ) daily at bedtime.  Has pain in the left eye and pressure-like sensation behind the left eye globe for a week.  Reports pain in the right eye for a few minutes 2 episodes.  Associated with sinus pressure on the left cheek, mild headache and floaters in the left eye.  Denied eye redness, gritty sensation, blindness, fever, chills, cough.  Patient had insurance issue and lost to follow-up with ophthalmologist for glaucoma.  No redness in the eye on physical exam.    Plans:  Travoprost eyedrops (1 drop to both eyes) daily  Ambulatory referral to ophthalmology provided  (Provided the resources and information about ophthalmology clinics which accept her insurance (Medicaid)  Discussed the ophthalmology emergency of glaucoma  Recommended to go to the ED to seek immediate medical treatment if patient has increasing left eye pain or acute blindness.  Follow-up with PCP for hypertension      Orders:    travoprost (TRAVATAN-Z) 0.004 % ophthalmic solution; Administer 1 drop to both eyes daily    Ambulatory Referral to Ophthalmology; Future

## 2025-01-15 NOTE — PATIENT INSTRUCTIONS
Ambulatory referral to ophthalmology  See eye drCasimiro as soon as possible  Travoprost eye drop one drop to each eye daily  Refilled BP med  Follow up as nneded

## 2025-01-15 NOTE — PROGRESS NOTES
Name: Kalie Franco      : 1960      MRN: [id number]  Encounter Provider: Elva Washburn MD  Encounter Date: 1/15/2025   Encounter department: [facility] [place]  :  Assessment & Plan  Essential hypertension  Blood pressure acceptable 122/78 mmHg.  Patient is compliant with her home blood pressure medicine  Medication refill provided.    Orders:    candesartan-hydrochlorothiazide (ATACAND HCT) 16-12.5 MG per tablet; Take 0.5 tablets by mouth daily    Acute pain in left eye  History of cataract and glaucoma per previous ophthalmologist.  Using travoprost eyedrops (1 drop to each eye ) daily at bedtime.  Has pain in the left eye and pressure-like sensation behind the left eye globe for a week.  Reports pain in the right eye for a few minutes 2 episodes.  Associated with sinus pressure on the left cheek, mild headache and floaters in the left eye.  Denied eye redness, gritty sensation, blindness, fever, chills, cough.  Patient had insurance issue and lost to follow-up with ophthalmologist for glaucoma.  No redness in the eye on physical exam.    Plans:  Travoprost eyedrops (1 drop to both eyes) daily  Ambulatory referral to ophthalmology provided  (Provided the resources and information about ophthalmology clinics which accept her insurance (Medicaid)  Discussed the ophthalmology emergency of glaucoma  Recommended to go to the ED to seek immediate medical treatment if patient has increasing left eye pain or acute blindness.  Follow-up with PCP for hypertension      Orders:    travoprost (TRAVATAN-Z) 0.004 % ophthalmic solution; Administer 1 drop to both eyes daily    Ambulatory Referral to Ophthalmology; Future           History of Present Illness     Kalie is a 64-year-old lady with past medical history of hypertension, TOM, cataract and glaucoma presents to the office today with chief complaint of left eye pain for a week.  She also reports floaters in the left eye and  pressure-like sensation behind the left eye globe.  She reports 2 episodes of pain in the right eye 2 days ago.  She also has some headache and pressure in the left sinus.  She has history of cataract s/p surgery in the right eye.  Denied discharge from the eyes, redness in the eyes, itchiness, gritty sensation in the eyes.  Denied fever, chills, shortness of breath, chest pain, or dizziness.  She wears glasses.  She had seen the ophthalmologist in the past and was recommended to use the eyedrop daily to reduce intraocular pressure.  In the meanwhile, she had no health insurance and did not follow-up with the eye doctor for glaucoma.  She has Medicaid which effectiveness starts today.  She called [facility] ophthalmology and many other ophthalmology clinics yesterday but no one accept her insurance.      Review of Systems   Constitutional:  Negative for chills and fever.   HENT:  Positive for sinus pressure. Negative for ear pain, rhinorrhea and sore throat.         Pressure around the left cheek/sinus area.   Eyes:  Positive for pain and visual disturbance. Negative for photophobia, discharge, redness and itching.        Left eye pain for a week.  Pressure-like sensation behind the left eye.  Floaters in the left eye.  Right eye pain 2 times.   Respiratory:  Negative for cough and shortness of breath.    Cardiovascular:  Negative for chest pain, palpitations and leg swelling.   Gastrointestinal:  Negative for abdominal pain and vomiting.   Genitourinary:  Negative for dysuria and hematuria.   Musculoskeletal:  Negative for arthralgias and back pain.   Skin:  Negative for color change and rash.   Neurological:  Negative for seizures and syncope.   All other systems reviewed and are negative.      Objective   /78 (BP Location: Left arm, Patient Position: Sitting, Cuff Size: Large)   Pulse 87   Temp 97.8 °F (36.6 °C) (Temporal)   Resp 18   Ht 5' (1.524 m)   Wt 76.4 kg (168 lb 6.4 oz)   SpO2 96%   BMI  32.89 kg/m²      Physical Exam  Vitals and nursing note reviewed.   Constitutional:       General: She is not in acute distress.     Appearance: She is well-developed.   HENT:      Head: Normocephalic and atraumatic.      Nose: Nose normal.      Mouth/Throat:      Mouth: Mucous membranes are moist.      Pharynx: Oropharynx is clear. No oropharyngeal exudate.   Eyes:      General: No scleral icterus.        Right eye: No discharge.         Left eye: No discharge.      Extraocular Movements: Extraocular movements intact.      Conjunctiva/sclera: Conjunctivae normal.      Pupils: Pupils are equal, round, and reactive to light.      Comments: Tenderness below the left eye on the left maxillary area.   Cardiovascular:      Rate and Rhythm: Normal rate and regular rhythm.      Heart sounds: Normal heart sounds. No murmur heard.  Pulmonary:      Effort: Pulmonary effort is normal. No respiratory distress.      Breath sounds: Normal breath sounds. No wheezing or rales.   Abdominal:      General: There is no distension.      Palpations: Abdomen is soft.      Tenderness: There is no abdominal tenderness.   Musculoskeletal:         General: No swelling.      Cervical back: Neck supple. No rigidity or tenderness.   Lymphadenopathy:      Cervical: No cervical adenopathy.   Skin:     General: Skin is warm and dry.      Capillary Refill: Capillary refill takes less than 2 seconds.   Neurological:      Mental Status: She is alert and oriented to person, place, and time.   Psychiatric:         Mood and Affect: Mood normal.

## 2025-01-17 ENCOUNTER — TELEPHONE (OUTPATIENT)
Dept: FAMILY MEDICINE CLINIC | Facility: CLINIC | Age: 65
End: 2025-01-17

## 2025-01-18 NOTE — TELEPHONE ENCOUNTER
PA for   candesartan-hydrochlorothiazide (ATACAND HCT) 16-12.5 MG per tablet   Submitted via:    []CMM-KEY:   [x]EPIC-Case ID # [id number]  []RX Benefits-EOD ID #   []Surescripts-Case ID #   []Faxed to plan   []Other website   []Phone call Case ID #     Office notes sent, clinical questions answered. Awaiting determination    Turnaround time for your insurance to make a decision on your Prior Authorization can take 3-5 business day.

## 2025-01-18 NOTE — TELEPHONE ENCOUNTER
PA for   travoprost (TRAVATAN-Z) 0.004 % ophthalmic solution  Submitted via:    []CMM-KEY:   [x]EPIC-Case ID # [id number]  []RX Benefits-EOD ID #   []Surescripts-Case ID #   []Faxed to plan   []Other website   []Phone call Case ID #     Office notes sent, clinical questions answered. Awaiting determination    Turnaround time for your insurance to make a decision on your Prior Authorization can take 3-5 business day.

## 2025-01-20 NOTE — TELEPHONE ENCOUNTER
Both medication denied by insurance. Patient need to try and failed what is on formulary. Letter scanned under media. Message already sent to provider.

## 2025-04-08 ENCOUNTER — OFFICE VISIT (OUTPATIENT)
Dept: FAMILY MEDICINE CLINIC | Facility: CLINIC | Age: 65
End: 2025-04-08

## 2025-04-08 VITALS
HEIGHT: 60 IN | TEMPERATURE: 97.7 F | RESPIRATION RATE: 20 BRPM | HEART RATE: 75 BPM | OXYGEN SATURATION: 95 % | BODY MASS INDEX: 33.38 KG/M2 | SYSTOLIC BLOOD PRESSURE: 126 MMHG | DIASTOLIC BLOOD PRESSURE: 79 MMHG | WEIGHT: 170 LBS

## 2025-04-08 DIAGNOSIS — I10 ESSENTIAL HYPERTENSION: ICD-10-CM

## 2025-04-08 DIAGNOSIS — Z23 ENCOUNTER FOR IMMUNIZATION: ICD-10-CM

## 2025-04-08 DIAGNOSIS — Z00.00 ANNUAL PHYSICAL EXAM: Primary | ICD-10-CM

## 2025-04-08 DIAGNOSIS — E78.2 MIXED HYPERLIPIDEMIA: ICD-10-CM

## 2025-04-08 DIAGNOSIS — E03.8 SUBCLINICAL HYPOTHYROIDISM: ICD-10-CM

## 2025-04-08 DIAGNOSIS — Z12.31 ENCOUNTER FOR SCREENING MAMMOGRAM FOR BREAST CANCER: ICD-10-CM

## 2025-04-08 DIAGNOSIS — Z12.83 SCREENING EXAM FOR SKIN CANCER: ICD-10-CM

## 2025-04-08 DIAGNOSIS — H54.7 VISION DECREASED: ICD-10-CM

## 2025-04-08 DIAGNOSIS — G47.33 OSA (OBSTRUCTIVE SLEEP APNEA): ICD-10-CM

## 2025-04-08 DIAGNOSIS — J30.2 SEASONAL ALLERGIC RHINITIS, UNSPECIFIED TRIGGER: ICD-10-CM

## 2025-04-08 DIAGNOSIS — H40.9 GLAUCOMA OF BOTH EYES, UNSPECIFIED GLAUCOMA TYPE: ICD-10-CM

## 2025-04-08 DIAGNOSIS — L98.9 SKIN LESION: ICD-10-CM

## 2025-04-08 DIAGNOSIS — R73.03 PREDIABETES: ICD-10-CM

## 2025-04-08 PROCEDURE — 90715 TDAP VACCINE 7 YRS/> IM: CPT | Performed by: FAMILY MEDICINE

## 2025-04-08 PROCEDURE — 99397 PER PM REEVAL EST PAT 65+ YR: CPT | Performed by: FAMILY MEDICINE

## 2025-04-08 PROCEDURE — 3078F DIAST BP <80 MM HG: CPT | Performed by: FAMILY MEDICINE

## 2025-04-08 PROCEDURE — 90471 IMMUNIZATION ADMIN: CPT | Performed by: FAMILY MEDICINE

## 2025-04-08 PROCEDURE — 3074F SYST BP LT 130 MM HG: CPT | Performed by: FAMILY MEDICINE

## 2025-04-08 PROCEDURE — 99213 OFFICE O/P EST LOW 20 MIN: CPT | Performed by: FAMILY MEDICINE

## 2025-04-08 NOTE — PROGRESS NOTES
Adult Annual Physical  Name: Kalie Franco      : 1960      MRN: [id number]  Encounter Provider: Shae oYon DO  Encounter Date: 2025   Encounter department: [facility] [place]    :  Assessment & Plan  Annual physical exam         BMI 33.0-33.9,adult  Discussed lifestyle modification, diet and exercise        TOM (obstructive sleep apnea)  Using CPAP       Glaucoma of both eyes, unspecified glaucoma type  Following with ophthalmology        Vision decreased  As above        Prediabetes  Due for labs   Orders:    Hemoglobin A1C; Future    Mixed hyperlipidemia  Due for labs   Orders:    Lipid Panel with Direct LDL reflex; Future    Subclinical hypothyroidism  Due for labs   Orders:    TSH, 3rd generation with Free T4 reflex; Future    Essential hypertension  Well-controlled, due for labs   Orders:    CBC and differential; Future    Comprehensive metabolic panel; Future    Seasonal allergic rhinitis, unspecified trigger  Persistent sinus congestion and post nasal drip, uses Singulair, Flonase       Skin lesion  Recommend dermatology referral   Orders:    Ambulatory Referral to Dermatology; Future    Screening exam for skin cancer    Orders:    Ambulatory Referral to Dermatology; Future    Encounter for screening mammogram for breast cancer    Orders:    Mammo screening bilateral w 3d and cad; Future    Encounter for immunization    Orders:    TDAP VACCINE GREATER THAN OR EQUAL TO 6YO IM    Annual physical exam               Preventive Screenings:  - Diabetes Screening: screening up-to-date  - Cholesterol Screening: screening not indicated and has hyperlipidemia   - Hepatitis C screening: screening up-to-date   - HIV screening: screening up-to-date   - Cervical cancer screening: risks/benefits discussed and orders placed   - Breast cancer screening: risks/benefits discussed and orders placed   - Colon cancer screening: screening up-to-date   - Lung cancer screening:  screening not indicated   - Osteoporosis screening: screening up-to-date     Immunizations:  - Immunizations due: Prevnar 20, Tdap, Zoster (Shingrix) and She is agreeable to Tdap today  - Risks/benefits immunizations discussed    - Immunizations given per orders      Counseling/Anticipatory Guidance:    - Diet: discussed recommendations for a healthy/well-balanced diet.   - Exercise: the importance of regular exercise/physical activity was discussed. Recommend exercise 3-5 times per week for at least 30 minutes.     BMI Counseling: Body mass index is 33.2 kg/m². The BMI is above normal. Nutrition recommendations include decreasing portion sizes, encouraging healthy choices of fruits and vegetables, decreasing fast food intake, consuming healthier snacks and moderation in carbohydrate intake. Exercise recommendations include moderate physical activity 150 minutes/week. Rationale for BMI follow-up plan is due to patient being overweight or obese.     Depression Screening and Follow-up Plan: Patient was screened for depression during today's encounter. They screened negative with a PHQ-2 score of 0.          History of Present Illness     Adult Annual Physical:  Patient presents for annual physical.     Diet and Physical Activity:  - Diet/Nutrition: well balanced diet, portion control and low carb diet.  - Exercise: no formal exercise.    Depression Screening:  - PHQ-2 Score: 0    General Health:  - Sleep: sleeps well, uses CPAP machine and 7-8 hours of sleep on average.  - Hearing: normal hearing bilateral ears.  - Vision: vision problems and most recent eye exam < 1 year ago. Difficulty with vision, history of glaucoma and cataracts, follows with ophthalmology  - Dental: no dental visits for > 1 year. Prefers to go to dentist in [facility]    /GYN Health:  - Follows with GYN: no.   - Menopause: postmenopausal.     She has not had a pap in over 20 years. Last menstrual cycle at age 54, no bleeding since.      Review of Systems   All other systems reviewed and are negative.        Objective   /79   Pulse 75   Temp 97.7 °F (36.5 °C) (Temporal)   Resp 20   Ht 5' (1.524 m)   Wt 77.1 kg (170 lb)   SpO2 95%   BMI 33.20 kg/m²     Physical Exam  Vitals reviewed.   Constitutional:       General: She is not in acute distress.     Appearance: Normal appearance. She is not ill-appearing, toxic-appearing or diaphoretic.   Eyes:      General:         Right eye: No discharge.         Left eye: No discharge.      Conjunctiva/sclera: Conjunctivae normal.   Cardiovascular:      Rate and Rhythm: Normal rate and regular rhythm.      Heart sounds: Normal heart sounds. No murmur heard.     No friction rub. No gallop.   Pulmonary:      Effort: Pulmonary effort is normal. No respiratory distress.      Breath sounds: Normal breath sounds. No stridor. No wheezing or rhonchi.   Skin:     General: Skin is warm.      Findings: No erythema or rash.      Comments: Scattered nevus and hypopigmented lesions, small 2-3 mm hypopigmented lesion along bra-line right mid back   Neurological:      Mental Status: She is alert and oriented to person, place, and time.      Motor: No weakness.   Psychiatric:         Mood and Affect: Mood normal.         Behavior: Behavior normal.

## 2025-04-08 NOTE — ASSESSMENT & PLAN NOTE
Well-controlled, due for labs   Orders:    CBC and differential; Future    Comprehensive metabolic panel; Future

## 2025-04-08 NOTE — PATIENT INSTRUCTIONS
"Patient Education     Routine physical for adults   The Basics   Written by the doctors and editors at [facility]   What is a physical? -- A physical is a routine visit, or \"check-up,\" with your doctor. You might also hear it called a \"wellness visit\" or \"preventive visit.\"  During each visit, the doctor will:   Ask about your physical and mental health   Ask about your habits, behaviors, and lifestyle   Do an exam   Give you vaccines if needed   Talk to you about any medicines you take   Give advice about your health   Answer your questions  Getting regular check-ups is an important part of taking care of your health. It can help your doctor find and treat any problems you have. But it's also important for preventing health problems.  A routine physical is different from a \"sick visit.\" A sick visit is when you see a doctor because of a health concern or problem. Since physicals are scheduled ahead of time, you can think about what you want to ask the doctor.  How often should I get a physical? -- It depends on your age and health. In general, for people age 21 years and older:   If you are younger than 50 years, you might be able to get a physical every 3 years.   If you are 50 years or older, your doctor might recommend a physical every year.  If you have an ongoing health condition, like diabetes or high blood pressure, your doctor will probably want to see you more often.  What happens during a physical? -- In general, each visit will include:   Physical exam - The doctor or nurse will check your height, weight, heart rate, and blood pressure. They will also look at your eyes and ears. They will ask about how you are feeling and whether you have any symptoms that bother you.   Medicines - It's a good idea to bring a list of all the medicines you take to each doctor visit. Your doctor will talk to you about your medicines and answer any questions. Tell them if you are having any side effects that bother you. You " "should also tell them if you are having trouble paying for any of your medicines.   Habits and behaviors - This includes:   Your diet   Your exercise habits   Whether you smoke, drink alcohol, or use drugs   Whether you are sexually active   Whether you feel safe at home  Your doctor will talk to you about things you can do to improve your health and lower your risk of health problems. They will also offer help and support. For example, if you want to quit smoking, they can give you advice and might prescribe medicines. If you want to improve your diet or get more physical activity, they can help you with this, too.   Lab tests, if needed - The tests you get will depend on your age and situation. For example, your doctor might want to check your:   Cholesterol   Blood sugar   Iron level   Vaccines - The recommended vaccines will depend on your age, health, and what vaccines you already had. Vaccines are very important because they can prevent certain serious or deadly infections.   Discussion of screening - \"Screening\" means checking for diseases or other health problems before they cause symptoms. Your doctor can recommend screening based on your age, risk, and preferences. This might include tests to check for:   Cancer, such as breast, prostate, cervical, ovarian, colorectal, prostate, lung, or skin cancer   Sexually transmitted infections, such as chlamydia and gonorrhea   Mental health conditions like depression and anxiety  Your doctor will talk to you about the different types of screening tests. They can help you decide which screenings to have. They can also explain what the results might mean.   Answering questions - The physical is a good time to ask the doctor or nurse questions about your health. If needed, they can refer you to other doctors or specialists, too.  Adults older than 65 years often need other care, too. As you get older, your doctor will talk to you about:   How to prevent falling at " home   Hearing or vision tests   Memory testing   How to take your medicines safely   Making sure that you have the help and support you need at home  All topics are updated as new evidence becomes available and our peer review process is complete.  This topic retrieved from [facility] on: May 02, 2024.  Topic 318985 Version 1.0  Release: 32.4.3 - C32.122  © 2024 UpToDate, Inc. and/or its affiliates. All rights reserved.  Consumer Information Use and Disclaimer   Disclaimer: This generalized information is a limited summary of diagnosis, treatment, and/or medication information. It is not meant to be comprehensive and should be used as a tool to help the user understand and/or assess potential diagnostic and treatment options. It does NOT include all information about conditions, treatments, medications, side effects, or risks that may apply to a specific patient. It is not intended to be medical advice or a substitute for the medical advice, diagnosis, or treatment of a health care provider based on the health care provider's examination and assessment of a patient's specific and unique circumstances. Patients must speak with a health care provider for complete information about their health, medical questions, and treatment options, including any risks or benefits regarding use of medications. This information does not endorse any treatments or medications as safe, effective, or approved for treating a specific patient. UpToDate, Inc. and its affiliates disclaim any warranty or liability relating to this information or the use thereof.The use of this information is governed by the Terms of Use, available at https://www.woltersRiskclickuwer.com/en/know/clinical-effectiveness-terms. 2024© UpToDate, Inc. and its affiliates and/or licensors. All rights reserved.  Copyright   © 2024 UpToDate, Inc. and/or its affiliates. All rights reserved.

## 2025-05-09 ENCOUNTER — ANNUAL EXAM (OUTPATIENT)
Dept: FAMILY MEDICINE CLINIC | Facility: CLINIC | Age: 65
End: 2025-05-09

## 2025-05-09 VITALS
DIASTOLIC BLOOD PRESSURE: 79 MMHG | OXYGEN SATURATION: 97 % | SYSTOLIC BLOOD PRESSURE: 129 MMHG | WEIGHT: 169.8 LBS | TEMPERATURE: 98.6 F | RESPIRATION RATE: 18 BRPM | HEART RATE: 83 BPM | BODY MASS INDEX: 33.16 KG/M2

## 2025-05-09 DIAGNOSIS — Z12.4 CERVICAL CANCER SCREENING: ICD-10-CM

## 2025-05-09 DIAGNOSIS — Z01.419 ROUTINE GYNECOLOGICAL EXAMINATION: Primary | ICD-10-CM

## 2025-05-09 PROCEDURE — G0145 SCR C/V CYTO,THINLAYER,RESCR: HCPCS

## 2025-05-09 PROCEDURE — G0476 HPV COMBO ASSAY CA SCREEN: HCPCS

## 2025-05-09 NOTE — PROGRESS NOTES
[place]ual [place] Exam  [facility] [place]: Kalie [place]  : 1960    Subjective      Kalie Franco is a 65 y.o. female who presents for annual gyn exam.     GYN:  Denies vaginal discharge, labial erythema or lesions, dyspareunia.  Previously had regular menses Q4 weeks; last period was 11years ago (approx age 54)  no unusual pelvic pain  no unusual vaginal discharge  no previous abnormal Pap tests  no family or personal history of cervical cancer  Contraception: None.   Patient is not sexually active.   Gynecologic surgeries: Reports c/s    OB:    (1 daughter via c/s; otherwise for A/P has had cholecystectomy and appendectomy)  Pregnancies were uncomplicated.    :  Denies dysuria, urinary frequency or urgency.  Denies hematuria, flank pain, incontinence.    Breast:  Denies breast mass, skin changes, dimpling, reddening, nipple retraction.  Denies breast discharge.  Patient does not do monthly breast exams.  Patient does not have a family history of breast, endometrial, or ovarian cancer    Screening:  Cervical cancer: Last pap done , no abnormalities, high-risk HPV negative  Breast cancer: Last mammogram was in . Results were normal--routine follow-up in 12 months.  STD screening: Very low risk of STD exposure      Review of Systems  Pertinent items are noted in HPI.      Objective      /79 (BP Location: Left arm, Patient Position: Sitting, Cuff Size: Standard)   Pulse 83   Temp 98.6 °F (37 °C) (Temporal)   Resp 18   Wt 77 kg (169 lb 12.8 oz)   SpO2 97%   BMI 33.16 kg/m²     Physical Exam  Constitutional:       General: She is not in acute distress.     Appearance: She is not ill-appearing or diaphoretic.   Genitourinary:      Right Labia: No rash, tenderness, lesions, skin changes or Bartholin's cyst.     Left Labia: No tenderness, lesions, skin changes, Bartholin's cyst or rash.     No labial fusion noted.      No vaginal bleeding or  ulceration.      No vaginal prolapse present.     No vaginal atrophy present.       Right Adnexa: not tender and no mass present.     Left Adnexa: not tender and no mass present.     No cervical discharge or friability.      Uterus is not tender.   HENT:      Head: Normocephalic and atraumatic.      Nose: No congestion or rhinorrhea.      Mouth/Throat:      Mouth: Mucous membranes are moist.   Eyes:      General: No scleral icterus.        Right eye: No discharge.         Left eye: No discharge.      Extraocular Movements: Extraocular movements intact.      Conjunctiva/sclera: Conjunctivae normal.   Cardiovascular:      Rate and Rhythm: Normal rate and regular rhythm.      Pulses: Normal pulses.      Heart sounds: No murmur heard.     No friction rub. No gallop.   Pulmonary:      Effort: Pulmonary effort is normal. No respiratory distress.      Breath sounds: Normal breath sounds. No stridor. No wheezing, rhonchi or rales.   Chest:      Chest wall: No tenderness.   Abdominal:      General: Abdomen is flat. There is no distension.      Palpations: Abdomen is soft. There is no mass.      Tenderness: There is no abdominal tenderness. There is no guarding or rebound.      Hernia: No hernia is present.   Musculoskeletal:         General: Normal range of motion.      Cervical back: Normal range of motion.      Right lower leg: No edema.      Left lower leg: No edema.   Neurological:      General: No focal deficit present.      Mental Status: She is alert.   Skin:     General: Skin is warm and dry.   Vitals reviewed. Exam conducted with a chaperone present.               Assessment/Plan:   Assessment & Plan  Routine gynecological examination         Cervical cancer screening    Orders:    Liquid-based pap, screening          -Pap smear with HPV co-testing performed today  -Will await results and notify patient when results received  -STI Screening: declined by patient  -Mammogram ordered this past month by PCP: pt states  that she will be moving to [place] soon; will get the mammogram ordered possibly in [place]  -All questions have been answered to her satisfaction.    Nicole Barrera, DO  PGY-2  [facility] [place]

## 2025-05-12 LAB
HPV HR 12 DNA CVX QL NAA+PROBE: NEGATIVE
HPV16 DNA CVX QL NAA+PROBE: NEGATIVE
HPV18 DNA CVX QL NAA+PROBE: NEGATIVE

## 2025-05-14 ENCOUNTER — RESULTS FOLLOW-UP (OUTPATIENT)
Dept: RHEUMATOLOGY | Facility: CLINIC | Age: 65
End: 2025-05-14

## 2025-05-14 LAB
LAB AP GYN PRIMARY INTERPRETATION: NORMAL
Lab: NORMAL

## 2025-06-04 ENCOUNTER — TELEPHONE (OUTPATIENT)
Dept: FAMILY MEDICINE CLINIC | Facility: CLINIC | Age: 65
End: 2025-06-04

## 2025-06-04 NOTE — TELEPHONE ENCOUNTER
The following blood work order (s) for this patient is overdue and were expected to be done on 4/8/25.     Blood work ordered and date: 4/8/25  TSH, 3rd generation with Free T4 reflex   Lipid Panel with Direct LDL reflex   Hemoglobin A1C   Comprehensive metabolic panel   CBC and differential     Attending supervising the visit related with this order (s) is Dr. Yoon.     Last visit on 5/9/25.  Next visit no scheduled.    Attending, please review this order (s) if needs to be canceled or patient needs to be called as a reminder. Thank you.

## 2025-06-10 ENCOUNTER — TELEPHONE (OUTPATIENT)
Dept: INTERNAL MEDICINE CLINIC | Facility: CLINIC | Age: 65
End: 2025-06-10

## 2025-06-17 NOTE — TELEPHONE ENCOUNTER
Patient contacted. Patient moved to the [place] [facility] two weeks ago permanently. Can all the orders active for this patient be cancelled?    Message to care [facility]ina [place] sent to paras Yoon as primary care provider.

## 2025-08-05 ENCOUNTER — TELEPHONE (OUTPATIENT)
Dept: INTERNAL MEDICINE CLINIC | Facility: CLINIC | Age: 65
End: 2025-08-05